# Patient Record
Sex: MALE | Race: WHITE | NOT HISPANIC OR LATINO | Employment: OTHER | URBAN - METROPOLITAN AREA
[De-identification: names, ages, dates, MRNs, and addresses within clinical notes are randomized per-mention and may not be internally consistent; named-entity substitution may affect disease eponyms.]

---

## 2018-10-11 ENCOUNTER — OFFICE VISIT (OUTPATIENT)
Dept: ORTHOPEDIC SURGERY | Facility: CLINIC | Age: 73
End: 2018-10-11

## 2018-10-11 VITALS — BODY MASS INDEX: 36.57 KG/M2 | HEART RATE: 62 BPM | HEIGHT: 69 IN | OXYGEN SATURATION: 98 % | WEIGHT: 246.91 LBS

## 2018-10-11 DIAGNOSIS — M25.512 LEFT SHOULDER PAIN, UNSPECIFIED CHRONICITY: Primary | ICD-10-CM

## 2018-10-11 DIAGNOSIS — M75.02 ADHESIVE CAPSULITIS OF LEFT SHOULDER: ICD-10-CM

## 2018-10-11 DIAGNOSIS — IMO0002 DISORDER OF ROTATOR CUFF SYNDROME OF LEFT SHOULDER AND ALLIED DISORDER: ICD-10-CM

## 2018-10-11 PROCEDURE — 99204 OFFICE O/P NEW MOD 45 MIN: CPT | Performed by: ORTHOPAEDIC SURGERY

## 2018-10-11 PROCEDURE — 20610 DRAIN/INJ JOINT/BURSA W/O US: CPT | Performed by: ORTHOPAEDIC SURGERY

## 2018-10-11 RX ORDER — PNV NO.95/FERROUS FUM/FOLIC AC 28MG-0.8MG
TABLET ORAL
COMMUNITY

## 2018-10-11 RX ORDER — ATORVASTATIN CALCIUM 40 MG/1
TABLET, FILM COATED ORAL
COMMUNITY
Start: 2018-10-09

## 2018-10-11 RX ORDER — LIDOCAINE HYDROCHLORIDE 10 MG/ML
3 INJECTION, SOLUTION INFILTRATION; PERINEURAL
Status: COMPLETED | OUTPATIENT
Start: 2018-10-11 | End: 2018-10-11

## 2018-10-11 RX ORDER — ASPIRIN 81 MG/1
81 TABLET ORAL DAILY
COMMUNITY

## 2018-10-11 RX ORDER — TRIAMCINOLONE ACETONIDE 40 MG/ML
40 INJECTION, SUSPENSION INTRA-ARTICULAR; INTRAMUSCULAR
Status: COMPLETED | OUTPATIENT
Start: 2018-10-11 | End: 2018-10-11

## 2018-10-11 RX ORDER — ALLOPURINOL 300 MG/1
TABLET ORAL
COMMUNITY
Start: 2018-08-17

## 2018-10-11 RX ORDER — METOLAZONE 5 MG/1
TABLET ORAL
COMMUNITY
Start: 2018-07-24

## 2018-10-11 RX ORDER — HYDRALAZINE HYDROCHLORIDE 50 MG/1
50 TABLET, FILM COATED ORAL 2 TIMES DAILY
COMMUNITY
Start: 2018-10-01

## 2018-10-11 RX ORDER — AMLODIPINE BESYLATE 5 MG/1
5 TABLET ORAL DAILY
COMMUNITY
Start: 2018-08-20

## 2018-10-11 RX ORDER — MAGNESIUM OXIDE 400 MG/1
400 TABLET ORAL DAILY
COMMUNITY

## 2018-10-11 RX ORDER — MELATONIN
2000 DAILY
COMMUNITY

## 2018-10-11 RX ADMIN — LIDOCAINE HYDROCHLORIDE 3 ML: 10 INJECTION, SOLUTION INFILTRATION; PERINEURAL at 15:06

## 2018-10-11 RX ADMIN — TRIAMCINOLONE ACETONIDE 40 MG: 40 INJECTION, SUSPENSION INTRA-ARTICULAR; INTRAMUSCULAR at 15:06

## 2018-10-11 NOTE — PROGRESS NOTES
[facility]    Subjective     Pain of the Left Shoulder (Left shoulder pain started 3-4 years ago.  He had a fall at that time.  /Pain is at a 6 when move and a 0 when resting/Had PT at time of fall/)      BRITTNEE Mobley is a 73 y.o. male.  Patient is here today for new problem today regarding his left shoulder.  This began about 3-4 years ago when he fell directly on the shoulder.  Initially, he went to physical therapy for 2 weeks and they really could not make a lot of ground.  They told him to live with it until he could no longer live with it.  He's had worsening pain over the last several months.  He rates the pain as 6 out of 10.  He has difficulty moving the shoulder and reaching behind him and overhead.  He's here for further evaluation and treatment.     Past Medical History:   Diagnosis Date   • Diabetes (CMS/HCC)    • Heart disease    • Hypertension    • Osteoarthritis       History reviewed. No pertinent surgical history.   Family History   Problem Relation Age of Onset   • Osteoarthritis Mother    • Hypertension Mother    • Osteoarthritis Father    • Heart attack Father      Social History     Social History   • Marital status:      Spouse name: N/A   • Number of children: N/A   • Years of education: N/A     Occupational History   • Not on file.     Social History Main Topics   • Smoking status: Former Smoker     Years: 4.00     Quit date: 1968   • Smokeless tobacco: Never Used   • Alcohol use No   • Drug use: No   • Sexual activity: Defer     Other Topics Concern   • Not on file     Social History Narrative   • No narrative on file      No current outpatient prescriptions on file prior to visit.     No current facility-administered medications on file prior to visit.       No Known Allergies     The following portions of the patient's history were reviewed and updated as appropriate: allergies, current medications, past family history, past medical history, past  "social history, past surgical history and problem list.    Review of Systems   Constitutional: Negative.    HENT: Negative.    Eyes: Negative.    Respiratory: Positive for apnea.    Cardiovascular: Negative.    Gastrointestinal: Negative.    Endocrine: Positive for cold intolerance.   Genitourinary: Negative.    Musculoskeletal: Positive for arthralgias.   Skin: Negative.    Allergic/Immunologic: Negative.    Neurological: Negative.    Hematological: Negative.    Psychiatric/Behavioral: Negative.         Objective      Physical Exam  Pulse 62   Ht 175.3 cm (69\")   Wt 112 kg (246 lb 14.6 oz)   SpO2 98%   BMI 36.46 kg/m²     Body mass index is 36.46 kg/m².    General  Mental Status - alert  General Appearance - cooperative, well groomed, not in acute distress  Orientation - Oriented X3  Build & Nutrition - well developed and well nourished  Posture - normal posture  Gait - normal gait     Integumentary  Global Assessment  Examination of related systems reveals - no lymphadenopathy  Ears:  No abnormality  Nose:  No mucous drainage  General Characteristics  Overall examination of the patient's skin reveals - no rashes, no evidence of scars, no suspicious lesions and no bruises.  Color - normal coloration of skin.  Vascular: Brisk capillary refill in all extremities    Ortho Exam  Musculoskeletal   Upper Extremity   Left Shoulder     Inspection and Palpation:     AC Joint Tenderness - mild    Sensation is normal    Examination reveals no ecchymosis.        Strength and Tone:    Supraspinatus - 5/5 with pain    External Rotators-5/5    Infraspinatus - 5/5    Subscapularis - 5/5    Deltoid - 5/5     Range of Motion      Left Shoulder:    Internal Rotation: ROM - hip pocket    External Rotation: AROM - 40 degrees    Elevation through flexion: AROM - 100 degrees        Impingement   Left shoulder    Banda-Jb impingement test positive    Neer impingement test negative     Functional Testing   Left shoulder    AC " crossover adduction test positive      Imaging/Studies  Imaging Results (last 24 hours)     Procedure Component Value Units Date/Time    XR Shoulder 2+ View Left [[id number]] Resulted:  10/11/18 1448     Updated:  10/11/18 1449    Narrative:       Left Shoulder X-Ray    Indication: Pain    Study:  AP, scapular Y, and axillary lateral views     Comparison: none    Findings:  No acute fractures are visualized.  No bony lesions are visualized.  Normal soft tissue appearance  AC joint:  Moderate joint space narrowing  Glenohumeral joint:  Minimal joint space narrowing  Acromion morphology:  Type 2      Impression:  Type II acromion, moderate acromioclavicular joint   degenerative changes.            Assessment:  1. Left shoulder pain, unspecified chronicity    2. Adhesive capsulitis of left shoulder    3. 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 Continue over-the-counter medication as needed for discomfort  2. Patient will be sent for physical therapy in [place]  3. Injection will be given into the left subacromial space today  4. We've talked about the natural history of this condition and given duration of symptoms, I'm concerned that his range of motion may never improve.  Consider modalities to the glenohumeral joint when he returns in 4-6 weeks.      Medical Decision Making  Management Options : over-the-counter medicine, prescription/IM medicine and physical/occupational therapy  Data/Risk: radiology tests and independent visualization of imaging, lab tests, or EMG/NCV    Zen Vang MD  10/11/18  6:02 PM

## 2018-10-11 NOTE — PROGRESS NOTES
Procedure   Large Joint Arthrocentesis  Date/Time: 10/11/2018 3:06 PM  Consent given by: patient  Site marked: site marked  Timeout: Immediately prior to procedure a time out was called to verify the correct patient, procedure, equipment, support staff and site/side marked as required   Supporting Documentation  Indications: pain   Procedure Details  Location: shoulder - L subacromial bursa  Preparation: Patient was prepped and draped in the usual sterile fashion  Needle size: 25 G  Approach: posterior  Medications administered: 3 mL lidocaine 1 %; 40 mg triamcinolone acetonide 40 MG/ML  Patient tolerance: patient tolerated the procedure well with no immediate complications

## 2018-11-20 ENCOUNTER — OFFICE VISIT (OUTPATIENT)
Dept: ORTHOPEDIC SURGERY | Facility: CLINIC | Age: 73
End: 2018-11-20

## 2018-11-20 VITALS — HEIGHT: 69 IN | BODY MASS INDEX: 36.96 KG/M2 | HEART RATE: 72 BPM | WEIGHT: 249.56 LBS | OXYGEN SATURATION: 96 %

## 2018-11-20 DIAGNOSIS — IMO0002 DISORDER OF ROTATOR CUFF SYNDROME OF LEFT SHOULDER AND ALLIED DISORDER: ICD-10-CM

## 2018-11-20 DIAGNOSIS — M25.512 LEFT SHOULDER PAIN, UNSPECIFIED CHRONICITY: Primary | ICD-10-CM

## 2018-11-20 DIAGNOSIS — M75.02 ADHESIVE CAPSULITIS OF LEFT SHOULDER: ICD-10-CM

## 2018-11-20 PROCEDURE — 99212 OFFICE O/P EST SF 10 MIN: CPT | Performed by: ORTHOPAEDIC SURGERY

## 2018-11-20 NOTE — PROGRESS NOTES
"    [facility]    Subjective     CC: Follow-up (6 week f/u; left shoulder pain, injection given at last visit)      BRITTNEE Mobley is a 73 y.o. male.  Patient returns the office today for follow-up of his left shoulder pain.  He was given a subacromial injection and as a result, he tells me he is much better overall.  He continues to do therapy in [place].       ROS:    Constiutional:Pt denies fever, chills, nausea, or vomiting.  MSK:as above    Objective      Past Medical History  Past Medical History:   Diagnosis Date   • Diabetes (CMS/HCC)    • Heart disease    • Hypertension    • Osteoarthritis          Physical Exam  Pulse 72   Ht 175.3 cm (69.02\")   Wt 113 kg (249 lb 9 oz)   SpO2 96%   BMI 36.84 kg/m²     Body mass index is 36.84 kg/m².    Patient is well nourished and well developed.        Ortho Exam  Forward elevation 140  External rotation 60  5 out of 5 subscap  5 out of 5 supraspinatus      Assessment:  1. Left shoulder pain, unspecified chronicity    2. Adhesive capsulitis of left shoulder    3. 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 Recommend over the counter anti-inflammatories for pain and/or swelling  2. Patient can be observed for now  3. Continue physical therapy        Zen Vang MD  11/20/18  6:46 PM  "

## 2020-11-12 ENCOUNTER — OFFICE VISIT (OUTPATIENT)
Dept: ENDOCRINOLOGY | Facility: CLINIC | Age: 75
End: 2020-11-12

## 2020-11-12 VITALS
HEART RATE: 61 BPM | SYSTOLIC BLOOD PRESSURE: 124 MMHG | BODY MASS INDEX: 39.63 KG/M2 | DIASTOLIC BLOOD PRESSURE: 80 MMHG | OXYGEN SATURATION: 97 % | WEIGHT: 267.6 LBS | TEMPERATURE: 97.5 F | HEIGHT: 69 IN

## 2020-11-12 DIAGNOSIS — Z79.4 LONG-TERM INSULIN USE (HCC): ICD-10-CM

## 2020-11-12 DIAGNOSIS — I10 BENIGN HYPERTENSION: Primary | ICD-10-CM

## 2020-11-12 DIAGNOSIS — E11.65 UNCONTROLLED TYPE 2 DIABETES MELLITUS WITH HYPERGLYCEMIA (HCC): ICD-10-CM

## 2020-11-12 PROBLEM — E78.00 PURE HYPERCHOLESTEROLEMIA: Status: ACTIVE | Noted: 2020-11-12

## 2020-11-12 PROBLEM — N18.9 CHRONIC RENAL FAILURE: Status: ACTIVE | Noted: 2020-11-12

## 2020-11-12 PROCEDURE — 99213 OFFICE O/P EST LOW 20 MIN: CPT | Performed by: INTERNAL MEDICINE

## 2020-11-12 RX ORDER — ISOSORBIDE MONONITRATE 30 MG/1
30 TABLET, EXTENDED RELEASE ORAL DAILY
COMMUNITY
Start: 2020-08-31

## 2020-11-12 RX ORDER — PEN NEEDLE, DIABETIC 32GX 5/32"
1 NEEDLE, DISPOSABLE MISCELLANEOUS 2 TIMES DAILY
Qty: 200 EACH | Refills: 3 | Status: SHIPPED | OUTPATIENT
Start: 2020-11-12 | End: 2021-12-15

## 2020-11-12 RX ORDER — INSULIN ASPART 100 [IU]/ML
INJECTION, SUSPENSION SUBCUTANEOUS
Qty: 27 ML | Refills: 5 | Status: SHIPPED | OUTPATIENT
Start: 2020-11-12 | End: 2021-03-11 | Stop reason: SDUPTHER

## 2020-11-12 RX ORDER — PEN NEEDLE, DIABETIC 32GX 5/32"
NEEDLE, DISPOSABLE MISCELLANEOUS 2 TIMES DAILY
COMMUNITY
Start: 2020-10-16 | End: 2020-11-12 | Stop reason: SDUPTHER

## 2020-11-12 RX ORDER — INSULIN ASPART 100 [IU]/ML
INJECTION, SUSPENSION SUBCUTANEOUS
COMMUNITY
Start: 2020-10-06 | End: 2020-11-12 | Stop reason: SDUPTHER

## 2020-11-12 NOTE — ASSESSMENT & PLAN NOTE
Diabetes is unchanged.   Continue current treatment regimen.  Diabetes will be reassessed in 3 months.    Recent A1c was 5.9%.

## 2020-12-03 ENCOUNTER — TELEPHONE (OUTPATIENT)
Dept: ENDOCRINOLOGY | Facility: CLINIC | Age: 75
End: 2020-12-03

## 2020-12-15 ENCOUNTER — TELEPHONE (OUTPATIENT)
Dept: ENDOCRINOLOGY | Facility: CLINIC | Age: 75
End: 2020-12-15

## 2020-12-15 NOTE — TELEPHONE ENCOUNTER
Called pt per wrm:  He reviewed bs's increase morning insulin to 50 units.  He voiced understanding

## 2021-03-11 ENCOUNTER — OFFICE VISIT (OUTPATIENT)
Dept: ENDOCRINOLOGY | Facility: CLINIC | Age: 76
End: 2021-03-11

## 2021-03-11 VITALS
BODY MASS INDEX: 39.84 KG/M2 | HEART RATE: 60 BPM | HEIGHT: 69 IN | SYSTOLIC BLOOD PRESSURE: 128 MMHG | WEIGHT: 269 LBS | OXYGEN SATURATION: 96 % | DIASTOLIC BLOOD PRESSURE: 72 MMHG | TEMPERATURE: 97.5 F

## 2021-03-11 DIAGNOSIS — Z79.4 LONG-TERM INSULIN USE (HCC): ICD-10-CM

## 2021-03-11 DIAGNOSIS — E11.65 UNCONTROLLED TYPE 2 DIABETES MELLITUS WITH HYPERGLYCEMIA (HCC): Primary | ICD-10-CM

## 2021-03-11 DIAGNOSIS — I10 BENIGN HYPERTENSION: ICD-10-CM

## 2021-03-11 DIAGNOSIS — E78.00 PURE HYPERCHOLESTEROLEMIA: ICD-10-CM

## 2021-03-11 LAB
EXPIRATION DATE: NORMAL
HBA1C MFR BLD: 5.6 %
Lab: NORMAL

## 2021-03-11 PROCEDURE — 83036 HEMOGLOBIN GLYCOSYLATED A1C: CPT | Performed by: INTERNAL MEDICINE

## 2021-03-11 PROCEDURE — 99214 OFFICE O/P EST MOD 30 MIN: CPT | Performed by: INTERNAL MEDICINE

## 2021-03-11 RX ORDER — INSULIN ASPART 100 [IU]/ML
INJECTION, SUSPENSION SUBCUTANEOUS
Qty: 90 ML | Refills: 3 | Status: SHIPPED | OUTPATIENT
Start: 2021-03-11 | End: 2021-09-20

## 2021-03-11 RX ORDER — BLOOD SUGAR DIAGNOSTIC
1 STRIP MISCELLANEOUS 2 TIMES DAILY
COMMUNITY
Start: 2021-02-12 | End: 2021-06-03 | Stop reason: SDUPTHER

## 2021-03-11 NOTE — PROGRESS NOTES
"     Office Note      Date: 2021  Patient Name: Abraham Mobley  MRN: [id number]  : 1945    Chief Complaint   Patient presents with   • Diabetes       History of Present Illness:   Abraham Mobley is a 75 y.o. male who presents for Diabetes type 2. Diagnosed in: . Treated in past with insulin. Current treatments: premix insulin. Number of insulin shots per day: 2. Checks blood sugar 2 times a day. Has low blood sugar: no. Aspirin use: Yes. Statin use: Yes. ACE-I/ARB use: No - per nephrology. Changes in health since last visit: none. Last eye exam .    Subjective      Diabetic Complications:  Eyes: No  Kidneys: Yes - CRI  Feet: No  Heart: No     Diet and Exercise:  Meals per day: 3  Minutes of exercise per week: 0 mins.    Review of Systems:   Review of Systems   Constitutional: Negative.    Cardiovascular: Negative.    Gastrointestinal: Negative.    Endocrine: Negative.        The following portions of the patient's history were reviewed and updated as appropriate: allergies, current medications, past family history, past medical history, past social history, past surgical history and problem list.    Objective       Visit Vitals  /72 (BP Location: Right arm, Patient Position: Sitting, Cuff Size: Adult)   Pulse 60   Temp 97.5 °F (36.4 °C) (Infrared)   Ht 175.3 cm (69\")   Wt 122 kg (269 lb)   SpO2 96%   BMI 39.72 kg/m²       Physical Exam:  Physical Exam  Constitutional:       Appearance: Normal appearance.   Neurological:      Mental Status: He is alert.         Labs:    HbA1c  Lab Results   Component Value Date    HGBA1C 5.6 2021       CMP  No results found for: GLUCOSE, BUN, CREATININE, EGFRIFNONA, EGFRIFAFRI, BCR, K, CO2, CALCIUM, PROTENTOTREF, LABIL2, BILIRUBIN, AST, ALT     Lipid Panel        TSH  No results found for: TSH, FREET4     Hemoglobin A1C  Lab Results   Component Value Date    HGBA1C 5.6 2021        Microalbumin/Creatinine  No results found for: MALBCRERATIO, CREATINIURIN, " MICROALBUR        Assessment / Plan      Assessment & Plan:  Diagnoses and all orders for this visit:    1. Uncontrolled type 2 diabetes mellitus with hyperglycemia (CMS/[facility]) (Primary)  Assessment & Plan:  Diabetes is unchanged.   Continue current treatment regimen.  But increase AM insulin due to some higher afternoon readings.  Diabetes will be reassessed in 3 months.    Orders:  -     POC Glycosylated Hemoglobin (Hb A1C)    2. Benign hypertension  Assessment & Plan:  Hypertension is unchanged.  Continue current treatment regimen.  Blood pressure will be reassessed at the next regular appointment.      3. Pure hypercholesterolemia  Assessment & Plan:  Continue statin.      4. Long-term insulin use (CMS/[facility])    Other orders  -     NovoLOG Mix 70/30 FlexPen (70-30) 100 UNIT/ML suspension pen-injector injection; Inject 55 units at breakfast and 45 units at supper.  Dispense: 90 mL; Refill: 3      Return in about 3 months (around 6/11/2021) for Recheck with A1c, TSH.    Harpreet Oliveros MD   03/11/2021

## 2021-03-11 NOTE — ASSESSMENT & PLAN NOTE
Diabetes is unchanged.   Continue current treatment regimen.  But increase AM insulin due to some higher afternoon readings.  Diabetes will be reassessed in 3 months.

## 2021-04-07 ENCOUNTER — TELEPHONE (OUTPATIENT)
Dept: ENDOCRINOLOGY | Facility: CLINIC | Age: 76
End: 2021-04-07

## 2021-04-07 NOTE — TELEPHONE ENCOUNTER
----- Message from Harpreet Oliveros MD sent at 4/6/2021  5:06 PM EDT -----      ----- Message -----  From: Dora Posada  Sent: 4/6/2021   1:57 PM EDT  To: Harpreet Oliveros MD

## 2021-05-14 ENCOUNTER — TELEPHONE (OUTPATIENT)
Dept: ENDOCRINOLOGY | Facility: CLINIC | Age: 76
End: 2021-05-14

## 2021-05-14 NOTE — TELEPHONE ENCOUNTER
Called pt per wrm:  He reviewed blood sugars increase morning 70/30 to 58 units.  He voiced understanding

## 2021-05-16 ENCOUNTER — TELEPHONE (OUTPATIENT)
Dept: ENDOCRINOLOGY | Facility: CLINIC | Age: 76
End: 2021-05-16

## 2021-05-16 NOTE — TELEPHONE ENCOUNTER
Contacted by patient's wife regarding recent hypoglycemia, weakness, chills.     Patients wife reports that 70/30 dose was recently increased by Dr. Oliveros to 58 units in the AM and 40 units in the evening. She reports that he has had hypoglycemia on the last 2 mornings, 35 yesterday AM and 50 this AM. She reports no recent hypoglycemia prior to this. 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 She reports that appetite had been reduced and that patient has felt poorly since surgery but denies any recent change in PO intake. She reports that he was not placed on steroids after surgery and was told that his other adrenal gland would compensate. He was admitted in late April to [facility] for UTI.    Following hypoglycemia yesterday, wife reports that she took patient to the [facility] ER  and that infection was ruled out and he was then released after correction of glucose. She reports that his mentation was altered this AM and thus she called EMS for transport the patient to the local hospital. She reports that again his glucose was treated and he was released. She reports that ER doctor recommended reduction in insulin, prompting her call.     At the time of call, patient's glucose was 192. Wife reports he had recently had a piece of candy due to fear of recurring hypoglycemia. He had last taken 50 units of 70/30 this AM.  Wife reported that patient was weak, having chills and was nauseated/dry heaving. She did not have thermometer to check temperature. I discussed with her that in the setting of a normal glucose, his symptoms could not be attributed to hypoglycemia and warranted further evaluation. I recommended she contact EMS for transport back to the ER.     Discussed with wife that such profound hypoglycemia is difficult to attribute to recent slight change in insulin (by 3 units) and my concern for something else contributing to low glucose levels.   Specifically, in the light of recent reported adrenal surgery, I advised of my concern for adrenal insufficiency and discussed that this could be life threatening. She was unsure if cortisol level had been checked but stated she would make provider aware of recent surgery and this concern. She was advised to call back if there were questions.    Again, wife was advised to call EMS to transport patient to ER for evaluation. She voiced understanding. Discussed that should he be released after evaluation, they may reduce insulin dose by half (29 units in the AM and 20 units in the evening, reviewed this should be held if not eating.) Advised patient's wife that I would notify Dr. Oliveros.

## 2021-05-17 ENCOUNTER — TELEPHONE (OUTPATIENT)
Dept: ENDOCRINOLOGY | Facility: CLINIC | Age: 76
End: 2021-05-17

## 2021-05-17 NOTE — TELEPHONE ENCOUNTER
See phone note from Dr La.  Would you call to check on him?  Can we get labs from the ER visit and see if they checked a cortisol level please?

## 2021-05-17 NOTE — TELEPHONE ENCOUNTER
Spoke with patients wife.  States he is currently inpatient at [facility] for double pneumonia.  She states they did a cortisol level and it was normal. They are monitoring his sugar as well and advised they would give insulin if needed.

## 2021-05-21 ENCOUNTER — TELEPHONE (OUTPATIENT)
Dept: ENDOCRINOLOGY | Facility: CLINIC | Age: 76
End: 2021-05-21

## 2021-05-21 NOTE — TELEPHONE ENCOUNTER
PT'S WIFE CALLED STATING THAT THIS PT WAS DISCHARGED FROM [facility] YESTERDAY. SHE STATED THE HOSPITALIST CHANGED HIS DM MEDS. PT'S WIFE STATED THAT [facility] SHOULD HAVE FAXED US SOMETHING IN REGARDS TO THIS. PT'S WIFE REQUESTED A CONSULT ON MED CHANGE. PLEASE CONFER W/ DR AND REACH OUT TO PT'S WIFE. THANK YOU

## 2021-05-21 NOTE — TELEPHONE ENCOUNTER
As long as he is eating okay, let's start back the insulin at 10u at BF and 10u at supper.  Send in readings next week.

## 2021-05-21 NOTE — TELEPHONE ENCOUNTER
Spoke with patients wife.  Was discharged yesterday.  Was hospitalized for hypoglycemia.  Held insulin for hospital stay.  Was told to hold blood sugar and give 8 units twice daily if blood sugars are >200.  Was on 58 units previously.  Requested discharge summary from [facility].

## 2021-06-01 ENCOUNTER — OFFICE VISIT (OUTPATIENT)
Dept: ENDOCRINOLOGY | Facility: CLINIC | Age: 76
End: 2021-06-01

## 2021-06-01 VITALS
DIASTOLIC BLOOD PRESSURE: 64 MMHG | SYSTOLIC BLOOD PRESSURE: 124 MMHG | HEIGHT: 69 IN | WEIGHT: 253 LBS | HEART RATE: 68 BPM | OXYGEN SATURATION: 96 % | BODY MASS INDEX: 37.47 KG/M2

## 2021-06-01 DIAGNOSIS — E11.649 TYPE 2 DIABETES MELLITUS WITH HYPOGLYCEMIA WITHOUT COMA, WITH LONG-TERM CURRENT USE OF INSULIN (HCC): ICD-10-CM

## 2021-06-01 DIAGNOSIS — I10 BENIGN HYPERTENSION: ICD-10-CM

## 2021-06-01 DIAGNOSIS — E11.65 UNCONTROLLED TYPE 2 DIABETES MELLITUS WITH HYPERGLYCEMIA (HCC): Primary | ICD-10-CM

## 2021-06-01 DIAGNOSIS — Z79.4 TYPE 2 DIABETES MELLITUS WITH HYPOGLYCEMIA WITHOUT COMA, WITH LONG-TERM CURRENT USE OF INSULIN (HCC): ICD-10-CM

## 2021-06-01 DIAGNOSIS — Z79.4 LONG-TERM INSULIN USE (HCC): ICD-10-CM

## 2021-06-01 DIAGNOSIS — E78.00 PURE HYPERCHOLESTEROLEMIA: ICD-10-CM

## 2021-06-01 PROCEDURE — 99214 OFFICE O/P EST MOD 30 MIN: CPT | Performed by: INTERNAL MEDICINE

## 2021-06-01 RX ORDER — TAMSULOSIN HYDROCHLORIDE 0.4 MG/1
1 CAPSULE ORAL DAILY
COMMUNITY

## 2021-06-01 RX ORDER — SODIUM BICARBONATE 650 MG/1
650 TABLET ORAL 3 TIMES DAILY
COMMUNITY
End: 2022-01-10

## 2021-06-01 RX ORDER — DOCUSATE SODIUM 100 MG/1
100 CAPSULE, LIQUID FILLED ORAL 2 TIMES DAILY
COMMUNITY
End: 2022-09-12

## 2021-06-01 NOTE — ASSESSMENT & PLAN NOTE
Requiring lower insulin doses due to decreased appetite and weight loss.  I would rather have his glucose a bit high than too low currently.  Continue to send in FSBS for insulin adjustments.

## 2021-06-01 NOTE — PROGRESS NOTES
"     Office Note      Date: 2021  Patient Name: Abraham Mobley  MRN: [id number]  : 1945    Chief Complaint   Patient presents with   • Diabetes       History of Present Illness:   Abraham Mobley is a 75 y.o. male who presents for Diabetes type 2. Diagnosed in: . Treated in past with insulin. Current treatments: premix insulin. Number of insulin shots per day: 2. Checks blood sugar 2 times a day. Has low blood sugar: yes. Aspirin use: Yes. Statin use: Yes. ACE-I/ARB use: No - per nephrology. Changes in health since last visit: hospitalized with bilateral pneumonia. Last eye exam .    He had some issues with hypoglycemia since last visit.  He was advised to go to ER.  He was admitted to [facility] with bilateral pneumonia.  Cortisol level was checked and was okay.  Insulin was changed while at the hospital.      Subjective      Diabetic Complications:  Eyes: No  Kidneys: Yes - CRI  Feet: No  Heart: No    Diet and Exercise:  Meals per day: 3  Minutes of exercise per week: 0 mins.    Review of Systems:   Review of Systems   Constitutional: Negative.    Cardiovascular: Negative.    Gastrointestinal: Negative.    Endocrine: Negative.        The following portions of the patient's history were reviewed and updated as appropriate: allergies, current medications, past family history, past medical history, past social history, past surgical history and problem list.    Objective       Visit Vitals  /64 (BP Location: Left arm, Patient Position: Sitting, Cuff Size: Adult)   Pulse 68   Ht 175.3 cm (69\")   Wt 115 kg (253 lb)   SpO2 96%   BMI 37.36 kg/m²       Physical Exam:  Physical Exam  Constitutional:       Appearance: Normal appearance.   Neurological:      Mental Status: He is alert.         Labs:    HbA1c  Lab Results   Component Value Date    HGBA1C 5.6 2021       CMP  No results found for: GLUCOSE, BUN, CREATININE, EGFRIFNONA, EGFRIFAFRI, BCR, K, CO2, CALCIUM, PROTENTOTREF, LABIL2, BILIRUBIN, AST, ALT "     Lipid Panel        TSH  No results found for: TSH, FREET4     Hemoglobin A1C  Lab Results   Component Value Date    HGBA1C 5.6 03/11/2021        Microalbumin/Creatinine  No results found for: MALBCRERATIO, CREATINIURIN, MICROALBUR        Assessment / Plan      Assessment & Plan:  Diagnoses and all orders for this visit:    1. Uncontrolled type 2 diabetes mellitus with hyperglycemia (CMS/[facility]) (Primary)  Assessment & Plan:  Requiring lower insulin doses due to decreased appetite and weight loss.  I would rather have his glucose a bit high than too low currently.  Continue to send in FSBS for insulin adjustments.    Orders:  -     Hemoglobin A1c; Future  -     TSH; Future    2. Benign hypertension  Assessment & Plan:  Hypertension is unchanged.  Continue current treatment regimen.  Blood pressure will be reassessed at the next regular appointment.      3. Pure hypercholesterolemia  Assessment & Plan:  Continue statin.      4. Long-term insulin use (CMS/[facility])    5. Type 2 diabetes mellitus with hypoglycemia without coma, with long-term current use of insulin (CMS/[facility])  Assessment & Plan:  We discussed treatment of hypoglycemia.  Will send Rx for glucagon.      Other orders  -     glucagon (GLUCAGEN) 1 MG injection; Infuse 1 mg into a venous catheter 1 (One) Time for 1 dose.  Dispense: 1 each; Refill: 1      Return in about 4 weeks (around 6/29/2021) for Recheck with A1c, TSH.    Harpreet Oliveros MD   06/01/2021

## 2021-06-03 RX ORDER — BLOOD SUGAR DIAGNOSTIC
1 STRIP MISCELLANEOUS 2 TIMES DAILY
Qty: 200 EACH | Refills: 5 | Status: SHIPPED | OUTPATIENT
Start: 2021-06-03 | End: 2021-06-04 | Stop reason: SDUPTHER

## 2021-06-04 RX ORDER — BLOOD SUGAR DIAGNOSTIC
1 STRIP MISCELLANEOUS 2 TIMES DAILY
Qty: 200 EACH | Refills: 5 | Status: SHIPPED | OUTPATIENT
Start: 2021-06-04 | End: 2022-07-01

## 2021-06-04 RX ORDER — IBUPROFEN 600 MG/1
TABLET ORAL
Qty: 1 EACH | Refills: 1 | Status: SHIPPED | OUTPATIENT
Start: 2021-06-04 | End: 2021-06-07 | Stop reason: CLARIF

## 2021-06-04 NOTE — TELEPHONE ENCOUNTER
[facility] CALLED. STATED THEY NEEDED A NEW PRESCRIPTION SENT OVER FOR THE GLUCAGON. STATED PT NEEDS INJECTIONS NOT VIALS. THEY ALSO SAID THAT PT'S TEST STRIPS NEED A DX CODE AND NEEDED TO BE SENT BACK OVER.

## 2021-06-07 ENCOUNTER — TELEPHONE (OUTPATIENT)
Dept: ENDOCRINOLOGY | Facility: CLINIC | Age: 76
End: 2021-06-07

## 2021-06-07 RX ORDER — GLUCAGON HYDROCHLORIDE 1 MG
KIT INJECTION
Qty: 1 EACH | Refills: 3 | Status: SHIPPED | OUTPATIENT
Start: 2021-06-07 | End: 2021-06-07 | Stop reason: CLARIF

## 2021-06-07 RX ORDER — GLUCAGON INJECTION, SOLUTION 1 MG/.2ML
1 INJECTION, SOLUTION SUBCUTANEOUS AS NEEDED
Qty: 0.4 ML | Refills: 3 | Status: SHIPPED | OUTPATIENT
Start: 2021-06-07

## 2021-06-07 NOTE — TELEPHONE ENCOUNTER
PER PHARMACY GLUCAGEN NOT COVERED BY INSURANCE EITHER, IT SHOW G-VOKE HYPOPEN COVERED. NOT SURE WHICH DOSE TO CHOOSE ON THE G-VOKE. PLEASE ADD AND SEND TO PHARMACY. NAFISA

## 2021-06-23 ENCOUNTER — TELEPHONE (OUTPATIENT)
Dept: ENDOCRINOLOGY | Facility: CLINIC | Age: 76
End: 2021-06-23

## 2021-06-23 NOTE — TELEPHONE ENCOUNTER
Fasting blood sugar has gone up 130-150, running about the same with dinner. Please call back the caregiver to discuss at [phone]

## 2021-06-23 NOTE — TELEPHONE ENCOUNTER
Spoke with Chandrika, patients caregiver.  She states that his fasting blood sugars have been 140's-150's and his blood sugar has been the same at supper.  He is checking twice daily.  When they sent in numbers previously he did not have much of an appetite and his numbers were lower.  He is now eating much better.  Did have one reading of 114 on 6/19/2021.  Patient currently taking Novolog 70/30 10 units twice daily.

## 2021-07-01 ENCOUNTER — OFFICE VISIT (OUTPATIENT)
Dept: ENDOCRINOLOGY | Facility: CLINIC | Age: 76
End: 2021-07-01

## 2021-07-01 VITALS
OXYGEN SATURATION: 98 % | SYSTOLIC BLOOD PRESSURE: 126 MMHG | DIASTOLIC BLOOD PRESSURE: 66 MMHG | BODY MASS INDEX: 37.77 KG/M2 | WEIGHT: 255 LBS | HEART RATE: 74 BPM | HEIGHT: 69 IN

## 2021-07-01 DIAGNOSIS — I10 BENIGN HYPERTENSION: ICD-10-CM

## 2021-07-01 DIAGNOSIS — Z79.4 LONG-TERM INSULIN USE (HCC): ICD-10-CM

## 2021-07-01 DIAGNOSIS — E11.649 TYPE 2 DIABETES MELLITUS WITH HYPOGLYCEMIA WITHOUT COMA, WITH LONG-TERM CURRENT USE OF INSULIN (HCC): ICD-10-CM

## 2021-07-01 DIAGNOSIS — E11.65 UNCONTROLLED TYPE 2 DIABETES MELLITUS WITH HYPERGLYCEMIA (HCC): Primary | ICD-10-CM

## 2021-07-01 DIAGNOSIS — Z79.4 TYPE 2 DIABETES MELLITUS WITH HYPOGLYCEMIA WITHOUT COMA, WITH LONG-TERM CURRENT USE OF INSULIN (HCC): ICD-10-CM

## 2021-07-01 LAB — HBA1C MFR BLD: 6.2 %

## 2021-07-01 PROCEDURE — 99214 OFFICE O/P EST MOD 30 MIN: CPT | Performed by: INTERNAL MEDICINE

## 2021-07-01 NOTE — ASSESSMENT & PLAN NOTE
Diabetes is unchanged.   Continue current treatment regimen.  Continue to send in FSBS for insulin adjustments.  Diabetes will be reassessed in 3 months.

## 2021-07-01 NOTE — PROGRESS NOTES
"     Office Note      Date: 2021  Patient Name: Abraham Mobley  MRN: [id number]  : 1945    Chief Complaint   Patient presents with   • Diabetes       History of Present Illness:   Abraham Mobley is a 76 y.o. male who presents for Diabetes type 2. Diagnosed in: . Treated in past with insulin. Current treatments: premix insulin. Number of insulin shots per day: 2. Checks blood sugar 2 times a day. Has low blood sugar: none. Aspirin use: Yes. Statin use: Yes. ACE-I/ARB use: No - per nephrology. Changes in health since last visit: none. Last eye exam .    He had labs done recently.  A1c was 6.2%.  The TSH was normal at 2.71.    Subjective      Diabetic Complications:  Eyes: No  Kidneys: Yes - CRI  Feet: No  Heart: No    Diet and Exercise:  Meals per day: 3  Minutes of exercise per week: 0 mins.    Review of Systems:   Review of Systems   Constitutional: Negative.    Cardiovascular: Negative.    Gastrointestinal: Negative.    Endocrine: Negative.        The following portions of the patient's history were reviewed and updated as appropriate: allergies, current medications, past family history, past medical history, past social history, past surgical history and problem list.    Objective       Visit Vitals  /66 (BP Location: Left arm, Patient Position: Sitting, Cuff Size: Adult)   Pulse 74   Ht 175.3 cm (69\")   Wt 116 kg (255 lb)   SpO2 98%   BMI 37.66 kg/m²       Physical Exam:  Physical Exam  Constitutional:       Appearance: Normal appearance.   Neurological:      Mental Status: He is alert.         Labs:    HbA1c  Lab Results   Component Value Date    HGBA1C 6.2 2021       CMP  No results found for: GLUCOSE, BUN, CREATININE, EGFRIFNONA, EGFRIFAFRI, BCR, K, CO2, CALCIUM, PROTENTOTREF, LABIL2, BILIRUBIN, AST, ALT     Lipid Panel        TSH  No results found for: TSH, FREET4     Hemoglobin A1C  Lab Results   Component Value Date    HGBA1C 6.2 2021        Microalbumin/Creatinine  No results " found for: MALBCRERATIO, CREATINIURIN, MICROALBUR        Assessment / Plan      Assessment & Plan:  Diagnoses and all orders for this visit:    1. Uncontrolled type 2 diabetes mellitus with hyperglycemia (CMS/[facility]) (Primary)  Assessment & Plan:  Diabetes is unchanged.   Continue current treatment regimen.  Continue to send in FSBS for insulin adjustments.  Diabetes will be reassessed in 3 months.      2. Type 2 diabetes mellitus with hypoglycemia without coma, with long-term current use of insulin (CMS/[facility])  Assessment & Plan:  Continue FSBS.  No lows since insulin was decreased.       3. Benign hypertension  Assessment & Plan:  Hypertension is unchanged.  Continue current treatment regimen.  Blood pressure will be reassessed at the next regular appointment.      4. Long-term insulin use (CMS/[facility])      Return in about 3 months (around 10/1/2021) for Recheck with A1c.    Harpreet Oliveros MD   07/01/2021

## 2021-08-03 ENCOUNTER — TELEPHONE (OUTPATIENT)
Dept: ENDOCRINOLOGY | Facility: CLINIC | Age: 76
End: 2021-08-03

## 2021-08-03 NOTE — TELEPHONE ENCOUNTER
Called pt to let him know his blood sugar readings was good and that no changes needed to be made. Pt understood

## 2021-09-09 ENCOUNTER — TELEPHONE (OUTPATIENT)
Dept: ENDOCRINOLOGY | Facility: CLINIC | Age: 76
End: 2021-09-09

## 2021-09-10 ENCOUNTER — TELEPHONE (OUTPATIENT)
Dept: ENDOCRINOLOGY | Facility: CLINIC | Age: 76
End: 2021-09-10

## 2021-09-10 NOTE — TELEPHONE ENCOUNTER
Results are scanned under media.  Per note from Dr. Oliveros, no changes.  Patient notified and verbalized understanding.

## 2021-09-10 NOTE — TELEPHONE ENCOUNTER
Please call pt he said he mailed us his blood sugars from august  He hasn't heard anything yet  Please call  [phone]

## 2021-09-20 ENCOUNTER — OFFICE VISIT (OUTPATIENT)
Dept: ENDOCRINOLOGY | Facility: CLINIC | Age: 76
End: 2021-09-20

## 2021-09-20 VITALS
WEIGHT: 254 LBS | RESPIRATION RATE: 18 BRPM | HEART RATE: 81 BPM | SYSTOLIC BLOOD PRESSURE: 150 MMHG | BODY MASS INDEX: 37.62 KG/M2 | DIASTOLIC BLOOD PRESSURE: 70 MMHG | OXYGEN SATURATION: 96 % | HEIGHT: 69 IN

## 2021-09-20 DIAGNOSIS — Z79.4 TYPE 2 DIABETES MELLITUS WITH CHRONIC KIDNEY DISEASE, WITH LONG-TERM CURRENT USE OF INSULIN, UNSPECIFIED CKD STAGE (HCC): ICD-10-CM

## 2021-09-20 DIAGNOSIS — E11.22 TYPE 2 DIABETES MELLITUS WITH CHRONIC KIDNEY DISEASE, WITH LONG-TERM CURRENT USE OF INSULIN, UNSPECIFIED CKD STAGE (HCC): ICD-10-CM

## 2021-09-20 DIAGNOSIS — Z79.4 TYPE 2 DIABETES MELLITUS WITH HYPERGLYCEMIA, WITH LONG-TERM CURRENT USE OF INSULIN (HCC): Primary | Chronic | ICD-10-CM

## 2021-09-20 DIAGNOSIS — E11.65 TYPE 2 DIABETES MELLITUS WITH HYPERGLYCEMIA, WITH LONG-TERM CURRENT USE OF INSULIN (HCC): Primary | Chronic | ICD-10-CM

## 2021-09-20 PROCEDURE — 99213 OFFICE O/P EST LOW 20 MIN: CPT | Performed by: PHYSICIAN ASSISTANT

## 2021-09-20 RX ORDER — INSULIN ASPART 100 [IU]/ML
INJECTION, SUSPENSION SUBCUTANEOUS
Start: 2021-09-20 | End: 2022-09-12 | Stop reason: SDUPTHER

## 2021-09-20 NOTE — PROGRESS NOTES
"     Office Note      Date: 2021  Patient Name: Abraham Mobley  MRN: [id number]  : 1945    Chief Complaint   Patient presents with   • uncontrolled type 2 diabetes       History of Present Illness:   Abraham Mobley is a 76 y.o. male who presents today for follow up on type 2 diabetes, diagnosed in .  Known diabetic complications: chronic kidney disease.  Current diabetic medications include Novolog 70/30.  Currently taking 12 units with breakfast and 12 units with dinner.  Current monitoring regimen: home blood tests - 2 times daily  Home blood sugar records: fasting 130-160, before dinner 130-160  Any episodes of hypoglycemia? None in the past few months.  Feet: No sores or other issues.   Eye exam current (within one year): no, ~2019. No retinopathy.  Current exercise: yard work  ACE inhibitor/ARB: No, per nephrology.  Statin: Yes, atorvastatin.  He has history of renal cell cancer, s/p right nephrectomy and right adrenalectomy.  Nephrologist - Dr. Andrea.  He had labs last week.  The A1c was 6.4%.  Creatinine 2.5, eGFR 27.    Subjective      Review of Systems:   Review of Systems   Constitutional: Negative.    Cardiovascular: Negative.    Gastrointestinal: Negative.    Endocrine: Negative.        The following portions of the patient's history were reviewed and updated as appropriate: allergies, current medications, past family history, past medical history, past social history, past surgical history and problem list.    Objective     Vitals:    21 1338   BP: 150/70   Pulse: 81   Resp: 18   SpO2: 96%   Weight: 115 kg (254 lb)   Height: 175.3 cm (69\")     Body mass index is 37.51 kg/m².    Physical Exam  Vitals reviewed.   Constitutional:       General: He is not in acute distress.  Neurological:      Mental Status: He is alert and oriented to person, place, and time.   Psychiatric:         Mood and Affect: Affect normal.         HEMOGLOBIN A1C  Lab Results   Component Value Date    HGBA1C 6.2 " 06/26/2021    HGBA1C 5.6 03/11/2021       Current Outpatient Medications   Medication Instructions   • allopurinol (ZYLOPRIM) 300 MG tablet No dose, route, or frequency recorded.   • amLODIPine (NORVASC) 5 MG tablet No dose, route, or frequency recorded.   • aspirin 81 mg, Oral, Daily   • atorvastatin (LIPITOR) 40 MG tablet No dose, route, or frequency recorded.   • BD Pen Needle Chela U/F 32G X 4 MM misc 1 each, Subcutaneous, 2 Times Daily   • cholecalciferol (VITAMIN D3) 2,000 Units, Oral, Daily   • docusate sodium (COLACE) 100 mg, Oral, 2 Times Daily   • Ferrous Sulfate (IRON) 325 (65 Fe) MG tablet Oral   • Gvoke HypoPen 2-Pack 1 mg, Subcutaneous, As Needed   • hydrALAZINE (APRESOLINE) 50 mg, Oral, 2 Times Daily   • isosorbide mononitrate (IMDUR) 30 mg, Oral, Daily   • Lactobacillus (PROBIOTIC ACIDOPHILUS PO) Oral   • magnesium oxide (MAG-OX) 400 mg, Oral, Daily   • metOLazone (ZAROXOLYN) 5 MG tablet No dose, route, or frequency recorded.   • metoprolol tartrate (LOPRESSOR) 25 MG tablet No dose, route, or frequency recorded.   • Multiple Vitamin (MULTI VITAMIN DAILY PO) Oral   • NovoLOG Mix 70/30 FlexPen (70-30) 100 UNIT/ML suspension pen-injector injection Inject 12 units at breakfast and 12 units at supper   • OneTouch Ultra test strip 1 each, Other, 2 Times Daily, to check glucose.  Dx. E11.65   • sodium bicarbonate 650 mg, Oral, 3 Times Daily   • tamsulosin (FLOMAX) 0.4 MG capsule 24 hr capsule 1 capsule, Oral, Daily       Assessment / Plan      Assessment & Plan:  1. Type 2 diabetes mellitus with hyperglycemia, with long-term current use of insulin (CMS/[facility])  A1c okay.  We want to avoid hypoglycemia.  Recommend to continue current doses of insulin.  He will send in blood sugars for review.  - NovoLOG Mix 70/30 FlexPen (70-30) 100 UNIT/ML suspension pen-injector injection; Inject 12 units at breakfast and 12 units at supper    2. Type 2 diabetes mellitus with chronic kidney disease, with long-term current use  of insulin, unspecified CKD stage (CMS/HCC)  Continue follow up with nephrology.      Return in about 3 months (around 12/20/2021) for recheck with A1c, foot exam - usually sees Dr. Harpreet Oliveros.     KRYSTINA Jimenez  Endocrinology  09/20/2021

## 2021-10-11 ENCOUNTER — TELEPHONE (OUTPATIENT)
Dept: ENDOCRINOLOGY | Facility: CLINIC | Age: 76
End: 2021-10-11

## 2021-10-11 NOTE — TELEPHONE ENCOUNTER
Called pt to let him know that his blood sugar readings looked okay and needs no change. Patient verbally understood.

## 2021-11-03 ENCOUNTER — TELEPHONE (OUTPATIENT)
Dept: ENDOCRINOLOGY | Facility: CLINIC | Age: 76
End: 2021-11-03

## 2021-12-15 RX ORDER — PEN NEEDLE, DIABETIC 32GX 5/32"
NEEDLE, DISPOSABLE MISCELLANEOUS
Qty: 200 EACH | Refills: 3 | Status: SHIPPED | OUTPATIENT
Start: 2021-12-15 | End: 2023-01-11

## 2022-01-05 ENCOUNTER — TELEPHONE (OUTPATIENT)
Dept: ENDOCRINOLOGY | Facility: CLINIC | Age: 77
End: 2022-01-05

## 2022-01-05 NOTE — TELEPHONE ENCOUNTER
Spoke with pt to let him know that his blood sugar logs looked okay, per Teresa Vang. Pt verbalized understanding.

## 2022-01-10 ENCOUNTER — OFFICE VISIT (OUTPATIENT)
Dept: ENDOCRINOLOGY | Facility: CLINIC | Age: 77
End: 2022-01-10

## 2022-01-10 VITALS
OXYGEN SATURATION: 99 % | HEIGHT: 69 IN | DIASTOLIC BLOOD PRESSURE: 62 MMHG | SYSTOLIC BLOOD PRESSURE: 138 MMHG | HEART RATE: 60 BPM | BODY MASS INDEX: 37.69 KG/M2 | WEIGHT: 254.5 LBS

## 2022-01-10 DIAGNOSIS — Z79.4 TYPE 2 DIABETES MELLITUS WITH CHRONIC KIDNEY DISEASE, WITH LONG-TERM CURRENT USE OF INSULIN, UNSPECIFIED CKD STAGE: ICD-10-CM

## 2022-01-10 DIAGNOSIS — E11.65 TYPE 2 DIABETES MELLITUS WITH HYPERGLYCEMIA, WITH LONG-TERM CURRENT USE OF INSULIN: Primary | Chronic | ICD-10-CM

## 2022-01-10 DIAGNOSIS — E11.22 TYPE 2 DIABETES MELLITUS WITH CHRONIC KIDNEY DISEASE, WITH LONG-TERM CURRENT USE OF INSULIN, UNSPECIFIED CKD STAGE: ICD-10-CM

## 2022-01-10 DIAGNOSIS — Z79.4 TYPE 2 DIABETES MELLITUS WITH HYPERGLYCEMIA, WITH LONG-TERM CURRENT USE OF INSULIN: Primary | Chronic | ICD-10-CM

## 2022-01-10 LAB
EXPIRATION DATE: NORMAL
GLUCOSE BLDC GLUCOMTR-MCNC: 122 MG/DL (ref 70–130)
HBA1C MFR BLD: 6.2 %
Lab: NORMAL

## 2022-01-10 PROCEDURE — 82962 GLUCOSE BLOOD TEST: CPT | Performed by: PHYSICIAN ASSISTANT

## 2022-01-10 PROCEDURE — 99213 OFFICE O/P EST LOW 20 MIN: CPT | Performed by: PHYSICIAN ASSISTANT

## 2022-01-10 PROCEDURE — 83036 HEMOGLOBIN GLYCOSYLATED A1C: CPT | Performed by: PHYSICIAN ASSISTANT

## 2022-01-10 PROCEDURE — 3044F HG A1C LEVEL LT 7.0%: CPT | Performed by: PHYSICIAN ASSISTANT

## 2022-01-10 NOTE — PROGRESS NOTES
"     Office Note      Date: 01/10/2022  Patient Name: Abraham Mobley  MRN: [id number]  : 1945    Chief Complaint   Patient presents with   • Diabetes       History of Present Illness:   Abraham Mobley is a 76 y.o. male who presents today for follow up on type 2 diabetes, diagnosed in .  Known diabetic complications: chronic kidney disease.  Current diabetic medications include Novolog 70/30.    He is currently taking 12 units with breakfast and 12 units with dinner.  Current monitoring regimen: testing 2 times per day.  Home blood sugar records: fasting typically 130-150, before dinner 130-160.  Any episodes of hypoglycemia? None in the past few months.  Feet: No sores or other issues.   Eye exam current (within one year): yes, . No retinopathy.  Current exercise: None.  ACE inhibitor/ARB: No, per nephrology.  Statin: Yes, atorvastatin.  He has history of renal cell cancer, s/p right nephrectomy and right adrenalectomy.  Nephrologist - Dr. Andrea.  He had labs recently with nephrology.  Creatinine 2.4, eGFR 28.    Subjective      Review of Systems:   Review of Systems   Constitutional: Negative.    Cardiovascular: Negative.    Gastrointestinal: Negative.    Endocrine: Negative.        The following portions of the patient's history were reviewed and updated as appropriate: allergies, current medications, past family history, past medical history, past social history, past surgical history and problem list.    Objective     Vitals:    01/10/22 1328   BP: 138/62   BP Location: Left arm   Patient Position: Sitting   Cuff Size: Adult   Pulse: 60   SpO2: 99%   Weight: 115 kg (254 lb 8 oz)   Height: 175.3 cm (69\")     Body mass index is 37.58 kg/m².    Physical Exam  Vitals reviewed.   Constitutional:       General: He is not in acute distress.  Cardiovascular:      Pulses:           Dorsalis pedis pulses are 2+ on the right side and 2+ on the left side.        Posterior tibial pulses are 2+ on the right side and 2+ " on the left side.   Musculoskeletal:      Right foot: No deformity.      Left foot: No deformity.   Feet:      Right foot:      Protective Sensation: 8 sites tested. 6 sites sensed.      Skin integrity: Skin integrity normal.      Toenail Condition: Right toenails are normal.      Left foot:      Protective Sensation: 8 sites tested. 6 sites sensed.      Skin integrity: Skin integrity normal.      Toenail Condition: Left toenails are normal.      Comments: vib sensation absent to great toes and first MTP joints.  Neurological:      Mental Status: He is alert and oriented to person, place, and time.   Psychiatric:         Mood and Affect: Affect normal.         HEMOGLOBIN A1C  Lab Results   Component Value Date    HGBA1C 6.2 01/10/2022    HGBA1C 6.2 06/26/2021    HGBA1C 5.6 03/11/2021     GLUCOSE  Lab Results   Component Value Date    POCGLU 122 01/10/2022         Current Outpatient Medications   Medication Instructions   • allopurinol (ZYLOPRIM) 300 MG tablet No dose, route, or frequency recorded.   • amLODIPine (NORVASC) 5 MG tablet No dose, route, or frequency recorded.   • aspirin 81 mg, Oral, Daily   • atorvastatin (LIPITOR) 40 MG tablet No dose, route, or frequency recorded.   • BD Pen Needle Chela 2nd Gen 32G X 4 MM misc USE  SUBCUTANEOUSLY TWICE DAILY TO  APPROPRIATE  AREA  AS  DIRECTED   • cholecalciferol (VITAMIN D3) 2,000 Units, Oral, Daily   • docusate sodium (COLACE) 100 mg, Oral, 2 Times Daily   • Ferrous Sulfate (IRON) 325 (65 Fe) MG tablet Oral   • Gvoke HypoPen 2-Pack 1 mg, Subcutaneous, As Needed   • hydrALAZINE (APRESOLINE) 50 mg, Oral, 2 Times Daily   • isosorbide mononitrate (IMDUR) 30 mg, Oral, Daily   • Lactobacillus (PROBIOTIC ACIDOPHILUS PO) Oral   • magnesium oxide (MAG-OX) 400 mg, Oral, Daily   • metOLazone (ZAROXOLYN) 5 MG tablet No dose, route, or frequency recorded.   • metoprolol tartrate (LOPRESSOR) 25 MG tablet No dose, route, or frequency recorded.   • Multiple Vitamin (MULTI VITAMIN  DAILY PO) Oral   • NovoLOG Mix 70/30 FlexPen (70-30) 100 UNIT/ML suspension pen-injector injection Inject 12 units at breakfast and 12 units at supper   • OneTouch Ultra test strip 1 each, Other, 2 Times Daily, to check glucose.  Dx. E11.65   • tamsulosin (FLOMAX) 0.4 MG capsule 24 hr capsule 1 capsule, Oral, Daily       Assessment / Plan      Assessment & Plan:  1. Type 2 diabetes mellitus with hyperglycemia, with long-term current use of insulin ([facility])  A1c okay.  No known hypoglycemia.  Continue current doses of Novolog 70/30.  - POC Glucose  - POC Glycosylated Hemoglobin (Hb A1C)    2. Type 2 diabetes mellitus with chronic kidney disease, with long-term current use of insulin, unspecified CKD stage (HCC)  Continue follow-up with nephrology.      Return in 4 months (on 5/10/2022) for recheck with A1c. He was advised to contact the office with any interval questions or concerns.    KRYSTINA Jimenez  Endocrinology  01/10/2022

## 2022-04-08 ENCOUNTER — TELEPHONE (OUTPATIENT)
Dept: ENDOCRINOLOGY | Facility: CLINIC | Age: 77
End: 2022-04-08

## 2022-04-08 NOTE — TELEPHONE ENCOUNTER
Patients wife called stated Don is having a colonoscopy next week and they are not sure how to proceed with taking his insulin. Please advise.

## 2022-04-09 NOTE — TELEPHONE ENCOUNTER
Spoke with patient's wife.  She is concerned due to history of severe hypoglycemia after surgery last year.  Advised to continue current dose of insulin if eating regular diet this weekend.  She says he will be eating low residue diet on Monday, then clear liquid diet on Tuesday.  Recommend to decrease insulin doses by 1/2 on Monday as it sounds like he will be eating smaller quantity of food/carbohydrates.  Will hold insulin on Tuesday for clear liquid diet and Wednesday morning of colonoscopy.  Advised to call back if any questions.

## 2022-04-12 NOTE — PROGRESS NOTES
"     Office Note      Date: 2020  Patient Name: Abraham Mobley  MRN: [id number]  : 1945    Chief Complaint   Patient presents with   • Follow-up   • Diabetes       History of Present Illness:   Abraham Mobley is a 75 y.o. male who presents for Diabetes type 2. Diagnosed in: . Treated in past with insulin. Current treatments: premix insulin. Number of insulin shots per day: 2. Checks blood sugar 2 times a day. Has low blood sugar: no. Aspirin use: Yes. Statin use: Yes. ACE-I/ARB use: No - per nephrology. Changes in health since last visit: none. Last eye exam .    Subjective      Diabetic Complications:  Eyes: No  Kidneys: Yes - CRI  Feet: No  Heart: No    Diet and Exercise:  Meals per day: 3  Minutes of exercise per week: 0 mins.    Review of Systems:   Review of Systems   Constitutional: Negative.    Respiratory: Positive for shortness of breath.    Cardiovascular: Negative.    Gastrointestinal: Negative.    Endocrine: Negative.        The following portions of the patient's history were reviewed and updated as appropriate: allergies, current medications, past family history, past medical history, past social history, past surgical history and problem list.    Objective       Visit Vitals  /80   Pulse 61   Temp 97.5 °F (36.4 °C) (Infrared)   Ht 175.3 cm (69\")   Wt 121 kg (267 lb 9.6 oz)   SpO2 97%   BMI 39.52 kg/m²       Physical Exam:  Physical Exam  Constitutional:       Appearance: Normal appearance.   Neurological:      Mental Status: He is alert.         Labs:    HbA1c  No results found for: HGBA1C    CMP  No results found for: GLUCOSE, BUN, CREATININE, EGFRIFNONA, EGFRIFAFRI, BCR, K, CO2, CALCIUM, PROTENTOTREF, LABIL2, BILIRUBIN, AST, ALT     Lipid Panel        TSH  No results found for: TSH, FREET4     Hemoglobin A1C        Microalbumin/Creatinine  No results found for: MALBCRERATIO, CREATINIURIN, MICROALBUR        Assessment / Plan      Assessment & Plan:  Problem List Items Addressed This " Visit        Cardiovascular and Mediastinum    Benign hypertension - Primary    Current Assessment & Plan     Hypertension is unchanged.  Continue current treatment regimen.  Blood pressure will be reassessed in 3 months.         Relevant Medications    amLODIPine (NORVASC) 5 MG tablet    metOLazone (ZAROXOLYN) 5 MG tablet    metoprolol tartrate (LOPRESSOR) 25 MG tablet    hydrALAZINE (APRESOLINE) 50 MG tablet       Endocrine    Uncontrolled type 2 diabetes mellitus with hyperglycemia (CMS/HCC)    Current Assessment & Plan     Diabetes is unchanged.   Continue current treatment regimen.  Diabetes will be reassessed in 3 months.    Recent A1c was 5.9%.         Relevant Medications    NovoLOG Mix 70/30 FlexPen (70-30) 100 UNIT/ML suspension pen-injector injection       Other    Long-term insulin use (CMS/HCC)           Return in about 3 months (around 2/12/2021) for Recheck with A1c.    Harpreet Oliveros MD   11/12/2020   TRANSFER

## 2022-05-10 ENCOUNTER — OFFICE VISIT (OUTPATIENT)
Dept: ENDOCRINOLOGY | Facility: CLINIC | Age: 77
End: 2022-05-10

## 2022-05-10 VITALS
BODY MASS INDEX: 38.51 KG/M2 | HEART RATE: 68 BPM | SYSTOLIC BLOOD PRESSURE: 138 MMHG | DIASTOLIC BLOOD PRESSURE: 82 MMHG | HEIGHT: 69 IN | WEIGHT: 260 LBS | OXYGEN SATURATION: 96 %

## 2022-05-10 DIAGNOSIS — Z79.4 TYPE 2 DIABETES MELLITUS WITH HYPERGLYCEMIA, WITH LONG-TERM CURRENT USE OF INSULIN: Primary | Chronic | ICD-10-CM

## 2022-05-10 DIAGNOSIS — E11.65 TYPE 2 DIABETES MELLITUS WITH HYPERGLYCEMIA, WITH LONG-TERM CURRENT USE OF INSULIN: Primary | Chronic | ICD-10-CM

## 2022-05-10 LAB
EXPIRATION DATE: ABNORMAL
EXPIRATION DATE: NORMAL
GLUCOSE BLDC GLUCOMTR-MCNC: 159 MG/DL (ref 70–130)
HBA1C MFR BLD: 6.2 %
Lab: ABNORMAL
Lab: NORMAL

## 2022-05-10 PROCEDURE — 3044F HG A1C LEVEL LT 7.0%: CPT | Performed by: PHYSICIAN ASSISTANT

## 2022-05-10 PROCEDURE — 99213 OFFICE O/P EST LOW 20 MIN: CPT | Performed by: PHYSICIAN ASSISTANT

## 2022-05-10 PROCEDURE — 83036 HEMOGLOBIN GLYCOSYLATED A1C: CPT | Performed by: PHYSICIAN ASSISTANT

## 2022-05-10 PROCEDURE — 82947 ASSAY GLUCOSE BLOOD QUANT: CPT | Performed by: PHYSICIAN ASSISTANT

## 2022-05-10 RX ORDER — PEN NEEDLE, DIABETIC 32GX 5/32"
NEEDLE, DISPOSABLE MISCELLANEOUS
Qty: 200 EACH | Refills: 3 | Status: CANCELLED | OUTPATIENT
Start: 2022-05-10

## 2022-05-10 NOTE — PROGRESS NOTES
"     Office Note      Date: 05/10/2022  Patient Name: Abraham Mobley  MRN: [id number]  : 1945    Chief Complaint   Patient presents with   • Diabetes       History of Present Illness:   Abraham Mobley is a 76 y.o. male who presents today for follow up on type 2 diabetes.  Diabetes was diagnosed in .  Known diabetic complications: chronic kidney disease.  He remains on Novolog 70/30.  He is taking 12 units with breakfast and 12 units with dinner.  He is testing blood sugars 2 times per day.  Fasting blood sugars are <150.  Readings before supper are more variable, but usually often <150.  He denies any hypoglycemia.  Current exercise: None. Right knee pain.  Feet: No sores.  Foot exam up to date.  Eye exam current (within one year): yes, . No retinopathy.  ACE inhibitor/ARB: No, per nephrology.  Statin: Yes, atorvastatin.  He had labs recently with nephrology.  Creatinine 2.7, eGFR 24.      Subjective        Past Medical History:   Diagnosis Date   • Anemia    • Arthritis    • Cardiomyopathy    • Chronic kidney disease    • Gout    • Heart disease    • History of blood transfusion    • Hyperlipidemia    • Hypertension    • Kidney stones    • Obesity    • Osteoarthritis    • PUD (peptic ulcer disease)     GI bleed   • Renal cell carcinoma of right kidney    • Type 2 diabetes mellitus       Past Surgical History:   Procedure Laterality Date   • NEPHRECTOMY Right    • ADRENALECTOMY Left    • ANKLE FUSION Left    • COLONOSCOPY     • STOMACH SURGERY     • TOTAL KNEE ARTHROPLASTY Left    • VAGOTOMY AND PYLOROPLASTY         The following portions of the patient's history were reviewed and updated as appropriate: allergies, current medications, past family history, past medical history, past social history, past surgical history and problem list.    Objective     Vitals:    05/10/22 1324   BP: 138/82   Pulse: 68   SpO2: 96%   Weight: 118 kg (260 lb)   Height: 175.3 cm (69\")   PainSc: 0-No pain     Body mass index " is 38.4 kg/m².    Physical Exam  Vitals reviewed.   Constitutional:       General: He is not in acute distress.  Neurological:      Mental Status: He is alert and oriented to person, place, and time.   Psychiatric:         Mood and Affect: Affect normal.         HEMOGLOBIN A1C  Lab Results   Component Value Date    HGBA1C 6.2 05/10/2022    HGBA1C 6.2 01/10/2022    HGBA1C 6.2 06/26/2021     GLUCOSE  Lab Results   Component Value Date    POCGLU 159 (A) 05/10/2022       Current Outpatient Medications   Medication Instructions   • allopurinol (ZYLOPRIM) 300 MG tablet No dose, route, or frequency recorded.   • amLODIPine (NORVASC) 5 MG tablet No dose, route, or frequency recorded.   • aspirin 81 mg, Oral, Daily   • atorvastatin (LIPITOR) 40 MG tablet No dose, route, or frequency recorded.   • BD Pen Needle Chela 2nd Gen 32G X 4 MM misc USE  SUBCUTANEOUSLY TWICE DAILY TO  APPROPRIATE  AREA  AS  DIRECTED   • cholecalciferol (VITAMIN D3) 2,000 Units, Oral, Daily   • docusate sodium (COLACE) 100 mg, Oral, 2 Times Daily   • Ferrous Sulfate (IRON) 325 (65 Fe) MG tablet Oral   • Gvoke HypoPen 2-Pack 1 mg, Subcutaneous, As Needed   • hydrALAZINE (APRESOLINE) 50 mg, Oral, 2 Times Daily   • isosorbide mononitrate (IMDUR) 30 mg, Oral, Daily   • Lactobacillus (PROBIOTIC ACIDOPHILUS PO) Oral   • magnesium oxide (MAG-OX) 400 mg, Oral, Daily   • metOLazone (ZAROXOLYN) 5 MG tablet No dose, route, or frequency recorded.   • metoprolol tartrate (LOPRESSOR) 25 MG tablet No dose, route, or frequency recorded.   • Multiple Vitamin (MULTI VITAMIN DAILY PO) Oral   • NovoLOG Mix 70/30 FlexPen (70-30) 100 UNIT/ML suspension pen-injector injection Inject 12 units at breakfast and 12 units at supper   • OneTouch Ultra test strip 1 each, Other, 2 Times Daily, to check glucose.  Dx. E11.65   • tamsulosin (FLOMAX) 0.4 MG capsule 24 hr capsule 1 capsule, Oral, Daily       Assessment / Plan      Assessment & Plan:  1. Type 2 diabetes mellitus with  hyperglycemia, with long-term current use of insulin (HCC)  A1c okay.  Reviewed blood sugar readings and discussed with patient and his wife.  No known hypoglycemia.  Continue Novolog 70/30.  - POC Glucose, Blood  - POC Glycosylated Hemoglobin (Hb A1C)      Return in about 4 months (around 9/10/2022) for next scheduled follow up. He was advised to contact the office with any interval questions or concerns.    KRYSTINA Jimenez  Endocrinology  05/10/2022

## 2022-06-06 ENCOUNTER — TELEPHONE (OUTPATIENT)
Dept: ENDOCRINOLOGY | Facility: CLINIC | Age: 77
End: 2022-06-06

## 2022-06-06 NOTE — TELEPHONE ENCOUNTER
Called pt to let him know Teresa reviewed his glucose log and there is no changes. Pt verbalized understanding.

## 2022-07-01 ENCOUNTER — TELEPHONE (OUTPATIENT)
Dept: ENDOCRINOLOGY | Facility: CLINIC | Age: 77
End: 2022-07-01

## 2022-07-01 RX ORDER — BLOOD SUGAR DIAGNOSTIC
STRIP MISCELLANEOUS
Qty: 200 EACH | Refills: 3 | Status: SHIPPED | OUTPATIENT
Start: 2022-07-01 | End: 2022-07-01 | Stop reason: SDUPTHER

## 2022-07-01 RX ORDER — BLOOD SUGAR DIAGNOSTIC
1 STRIP MISCELLANEOUS 2 TIMES DAILY
Qty: 200 EACH | Refills: 3 | Status: SHIPPED | OUTPATIENT
Start: 2022-07-01 | End: 2023-01-11 | Stop reason: SDUPTHER

## 2022-07-01 NOTE — TELEPHONE ENCOUNTER
PHARMACY NEEDS DIAGNOSIS CODE FOR TEST STRIPS. PLEASE SEND NEW RX CONTAINING THE NEEDED CODE SO THEY CAN BILL MEDICARE.

## 2022-08-08 ENCOUNTER — TELEPHONE (OUTPATIENT)
Dept: ENDOCRINOLOGY | Facility: CLINIC | Age: 77
End: 2022-08-08

## 2022-08-08 NOTE — TELEPHONE ENCOUNTER
Spoke with patient regarding glucose levels.  Patient is aware that no changes are needed per Dr KADIE Oliveros.

## 2022-09-08 ENCOUNTER — TELEPHONE (OUTPATIENT)
Dept: ENDOCRINOLOGY | Facility: CLINIC | Age: 77
End: 2022-09-08

## 2022-09-08 NOTE — TELEPHONE ENCOUNTER
I called to  notify the patient that there was no changes in any dose.The patient verify and understood.

## 2022-09-12 ENCOUNTER — OFFICE VISIT (OUTPATIENT)
Dept: ENDOCRINOLOGY | Facility: CLINIC | Age: 77
End: 2022-09-12

## 2022-09-12 VITALS
DIASTOLIC BLOOD PRESSURE: 68 MMHG | WEIGHT: 262 LBS | HEART RATE: 71 BPM | BODY MASS INDEX: 38.8 KG/M2 | OXYGEN SATURATION: 98 % | SYSTOLIC BLOOD PRESSURE: 130 MMHG | HEIGHT: 69 IN

## 2022-09-12 DIAGNOSIS — Z79.4 TYPE 2 DIABETES MELLITUS WITH HYPERGLYCEMIA, WITH LONG-TERM CURRENT USE OF INSULIN: Primary | Chronic | ICD-10-CM

## 2022-09-12 DIAGNOSIS — E11.65 TYPE 2 DIABETES MELLITUS WITH HYPERGLYCEMIA, WITH LONG-TERM CURRENT USE OF INSULIN: Primary | Chronic | ICD-10-CM

## 2022-09-12 LAB
EXPIRATION DATE: ABNORMAL
EXPIRATION DATE: NORMAL
GLUCOSE BLDC GLUCOMTR-MCNC: 186 MG/DL (ref 70–130)
HBA1C MFR BLD: 6.1 %
Lab: ABNORMAL
Lab: NORMAL

## 2022-09-12 PROCEDURE — 3044F HG A1C LEVEL LT 7.0%: CPT | Performed by: PHYSICIAN ASSISTANT

## 2022-09-12 PROCEDURE — 99213 OFFICE O/P EST LOW 20 MIN: CPT | Performed by: PHYSICIAN ASSISTANT

## 2022-09-12 PROCEDURE — 83036 HEMOGLOBIN GLYCOSYLATED A1C: CPT | Performed by: PHYSICIAN ASSISTANT

## 2022-09-12 PROCEDURE — 82947 ASSAY GLUCOSE BLOOD QUANT: CPT | Performed by: PHYSICIAN ASSISTANT

## 2022-09-12 RX ORDER — HYDRALAZINE HYDROCHLORIDE 50 MG/1
TABLET, FILM COATED ORAL EVERY 12 HOURS SCHEDULED
COMMUNITY
End: 2022-09-12

## 2022-09-12 RX ORDER — INSULIN ASPART 100 [IU]/ML
INJECTION, SUSPENSION SUBCUTANEOUS
Qty: 30 ML | Refills: 3 | Status: SHIPPED | OUTPATIENT
Start: 2022-09-12 | End: 2023-01-17

## 2022-09-12 NOTE — PROGRESS NOTES
Office Note      Date: 2022  Patient Name: Abraham Mobley  MRN: [id number]  : 1945    Chief Complaint   Patient presents with   • Diabetes     History of Present Illness:   Abraham Mobley is a 77 y.o. male who presents today for follow up on type 2 diabetes.  Diabetes was diagnosed in .  Known diabetic complications: chronic kidney disease.  Current diabetic medications: Novolog 70/30.  He is testing blood sugars twice per day.  Fasting readings are often <150, but some up to 160s.  Readings before dinner variable from 111-185, typically mid 100s.  He denies any hypoglycemia.  Lowest blood sugar has been 74.  This was after eating dinner.  He reports having snack around 8 PM.  This is usually ice cream.  He is not exercising.  He reports shortness of breath with activity - unchanged.  He is followed by cardiology.  He reports being told that his heart function was fine.  Feet: No sores or other issues.  Foot exam up to date.  Last eye exam:  - scheduled for next week. No retinopathy.  ACE inhibitor/ARB: No.  Statin: Atorvastatin.  CKD, stage 4 - followed by nephrology.  He has history of renal cell cancer, s/p right nephrectomy and right adrenalectomy.  He reports that urologist is now Dr. Nirav Haq.  He reports being told that there are cysts on left kidney.  He reports that he is scheduled for CT scan in 2022.    Subjective      Review of Systems   Constitutional: Negative.   Cardiovascular: Positive for dyspnea on exertion.   Endocrine: Negative.    Gastrointestinal: Negative.      Past Medical History:   Diagnosis Date   • Anemia    • Arthritis    • Cardiomyopathy    • Chronic kidney disease    • Gout    • Heart disease    • History of blood transfusion    • Hyperlipidemia    • Hypertension    • Kidney stones    • Obesity    • Osteoarthritis    • PUD (peptic ulcer disease)     GI bleed   • Renal cell carcinoma of right kidney    • Type 2 diabetes mellitus       Past Surgical History:  "  Procedure Laterality Date   • NEPHRECTOMY Right 2016   • ADRENALECTOMY Left 2021   • ANKLE FUSION Left    • COLONOSCOPY     • STOMACH SURGERY     • TOTAL KNEE ARTHROPLASTY Left    • VAGOTOMY AND PYLOROPLASTY       The following portions of the patient's history were reviewed and updated as appropriate: allergies, current medications, past family history, past medical history, past social history, past surgical history and problem list is.    Objective     Vitals:    09/12/22 1254   BP: 130/68   Pulse: 71   SpO2: 98%   Weight: 119 kg (262 lb)   Height: 175.3 cm (69\")   Body mass index is 38.69 kg/m².    Physical Exam  Vitals reviewed.   Constitutional:       General: He is not in acute distress.  Neurological:      Mental Status: He is alert and oriented to person, place, and time.   Psychiatric:         Mood and Affect: Affect normal.       HEMOGLOBIN A1C  Lab Results   Component Value Date    HGBA1C 6.1 09/12/2022    HGBA1C 6.2 05/10/2022    HGBA1C 6.2 01/10/2022     GLUCOSE  Lab Results   Component Value Date    POCGLU 186 (A) 09/12/2022       Current Outpatient Medications   Medication Instructions   • allopurinol (ZYLOPRIM) 300 MG tablet No dose, route, or frequency recorded.   • amLODIPine (NORVASC) 5 MG tablet No dose, route, or frequency recorded.   • aspirin 81 mg, Oral, Daily   • atorvastatin (LIPITOR) 40 MG tablet No dose, route, or frequency recorded.   • BD Pen Needle Chela 2nd Gen 32G X 4 MM misc USE  SUBCUTANEOUSLY TWICE DAILY TO  APPROPRIATE  AREA  AS  DIRECTED   • cholecalciferol (VITAMIN D3) 2,000 Units, Oral, Daily   • Ferrous Sulfate (IRON) 325 (65 Fe) MG tablet Oral   • Gvoke HypoPen 2-Pack 1 mg, Subcutaneous, As Needed   • hydrALAZINE (APRESOLINE) 50 mg, Oral, 2 Times Daily   • isosorbide mononitrate (IMDUR) 30 mg, Oral, Daily   • Lactobacillus (PROBIOTIC ACIDOPHILUS PO) Oral   • magnesium oxide (MAG-OX) 400 mg, Oral, Daily   • metOLazone (ZAROXOLYN) 5 MG tablet No dose, route, or frequency " recorded.   • metoprolol tartrate (LOPRESSOR) 25 MG tablet No dose, route, or frequency recorded.   • Multiple Vitamin (MULTI VITAMIN DAILY PO) Oral   • NovoLOG Mix 70/30 FlexPen (70-30) 100 UNIT/ML suspension pen-injector injection Inject 12 units at breakfast and 12 units at supper; titrate to max 30 units daily   • OneTouch Ultra test strip 1 each, Other, 2 Times Daily, to check glucose twice daily.  Dx E11.65   • tamsulosin (FLOMAX) 0.4 MG capsule 24 hr capsule 1 capsule, Oral, Daily       Assessment / Plan      Assessment & Plan:  1. Type 2 diabetes mellitus with hyperglycemia, with long-term current use of insulin (HCC)  A1c okay.  Blood sugars are variable, but reasonable overall.  Want to avoid hypoglycemia.  Continue current doses of Novolog 70/30.  We discussed testing to see how meals are affecting blood sugar.  Encouraged to increase physical activity.  Encouraged nutritious dietary choices, moderation of carbohydrates, avoidance of added sugar.  BP okay.  - POC Glucose, Blood  - POC Glycosylated Hemoglobin (Hb A1C)  - NovoLOG Mix 70/30 FlexPen (70-30) 100 UNIT/ML suspension pen-injector injection; Inject 12 units at breakfast and 12 units at supper; titrate to max 30 units daily  Dispense: 30 mL; Refill: 3      Return in about 4 months (around 1/12/2023) for recheck with A1c and foot exam. He was advised to contact the office with any interval questions or concerns.    KRYSTINA Jimenez  Endocrinology  09/12/2022

## 2022-10-07 ENCOUNTER — TELEPHONE (OUTPATIENT)
Dept: ENDOCRINOLOGY | Facility: CLINIC | Age: 77
End: 2022-10-07

## 2022-11-10 ENCOUNTER — TELEPHONE (OUTPATIENT)
Dept: ENDOCRINOLOGY | Facility: CLINIC | Age: 77
End: 2022-11-10

## 2022-11-10 NOTE — TELEPHONE ENCOUNTER
Spoke with patient RE glucose readings.  Patient understands to increase insulin at breakfast from 12 units to 14 units and to continue the 12 units at supper

## 2022-12-07 ENCOUNTER — TELEPHONE (OUTPATIENT)
Dept: ENDOCRINOLOGY | Facility: CLINIC | Age: 77
End: 2022-12-07

## 2023-01-09 ENCOUNTER — TELEPHONE (OUTPATIENT)
Dept: ENDOCRINOLOGY | Facility: CLINIC | Age: 78
End: 2023-01-09
Payer: MEDICARE

## 2023-01-11 RX ORDER — BLOOD SUGAR DIAGNOSTIC
1 STRIP MISCELLANEOUS 2 TIMES DAILY
Qty: 200 EACH | Refills: 3 | Status: SHIPPED | OUTPATIENT
Start: 2023-01-11

## 2023-01-11 RX ORDER — PEN NEEDLE, DIABETIC 32GX 5/32"
NEEDLE, DISPOSABLE MISCELLANEOUS
Qty: 100 EACH | Refills: 0 | Status: SHIPPED | OUTPATIENT
Start: 2023-01-11 | End: 2023-03-17

## 2023-01-17 ENCOUNTER — OFFICE VISIT (OUTPATIENT)
Dept: ENDOCRINOLOGY | Facility: CLINIC | Age: 78
End: 2023-01-17
Payer: MEDICARE

## 2023-01-17 VITALS
WEIGHT: 261.2 LBS | BODY MASS INDEX: 38.69 KG/M2 | SYSTOLIC BLOOD PRESSURE: 130 MMHG | OXYGEN SATURATION: 96 % | HEIGHT: 69 IN | DIASTOLIC BLOOD PRESSURE: 82 MMHG | HEART RATE: 85 BPM

## 2023-01-17 DIAGNOSIS — E11.65 TYPE 2 DIABETES MELLITUS WITH HYPERGLYCEMIA, WITH LONG-TERM CURRENT USE OF INSULIN: Primary | Chronic | ICD-10-CM

## 2023-01-17 DIAGNOSIS — Z79.4 TYPE 2 DIABETES MELLITUS WITH HYPERGLYCEMIA, WITH LONG-TERM CURRENT USE OF INSULIN: Primary | Chronic | ICD-10-CM

## 2023-01-17 DIAGNOSIS — E66.01 CLASS 2 SEVERE OBESITY DUE TO EXCESS CALORIES WITH SERIOUS COMORBIDITY AND BODY MASS INDEX (BMI) OF 38.0 TO 38.9 IN ADULT: Chronic | ICD-10-CM

## 2023-01-17 LAB — HBA1C MFR BLD: 6.7 %

## 2023-01-17 PROCEDURE — 99214 OFFICE O/P EST MOD 30 MIN: CPT | Performed by: PHYSICIAN ASSISTANT

## 2023-01-17 RX ORDER — INSULIN ASPART 100 [IU]/ML
INJECTION, SUSPENSION SUBCUTANEOUS
Qty: 30 ML | Refills: 3 | Status: CANCELLED | OUTPATIENT
Start: 2023-01-17

## 2023-01-17 RX ORDER — INSULIN ASPART 100 [IU]/ML
INJECTION, SUSPENSION SUBCUTANEOUS
Start: 2023-01-17 | End: 2023-03-09

## 2023-01-17 NOTE — PROGRESS NOTES
Office Note      Date: 2023  Patient Name: Abraham Mobley  MRN: [id number]  : 1945    Chief Complaint   Patient presents with   • Diabetes     History of Present Illness:   Abraham Mobley is a 77 y.o. male who presents today for follow up on type 2 diabetes.  Diabetes was diagnosed in .  Known diabetic complications: Chronic kidney disease.  Current diabetic medications: NovoLog 70/30.  Current doses are 14 units with breakfast and 12 units with supper.  He is testing blood sugars 2 times per day.  Fasting blood sugars typically <150, vary from 118-164.  Readings before supper are more variable from 120-207.  He denies any recent hypoglycemia.  Feet: No sores.  Foot exam today.  Last eye exam: ~2022.  ACE inhibitor/ARB: No.  Statin: Atorvastatin.  He is followed by nephrology (Dr. Andrea) for CKD, stage 4.  He had labs on 2023 that showed creatinine 2.3, eGFR 29.  A1c was 6.7%.  History of renal cell cancer, s/p right nephrectomy and left adrenalectomy.  They are monitoring cysts in left kidney.  One high attenuation cyst appeared mildly larger on CT 2022.  Plan for repeat CT after 6 months.  Urologist is Dr. Nirav Haq.  He reports fall in November.  He fell backward on brick steps and landed on concrete.  He reports that he did not seek evaluation, but thinks that he fractured ribs on the left and fractured right great toe.    Subjective      Review of Systems   Constitutional: Negative.    Cardiovascular: Negative.    Endocrine: Negative.      Past Medical History:   Diagnosis Date   • Anemia    • Arthritis    • Cardiomyopathy    • Chronic kidney disease    • Gout    • Heart disease    • History of blood transfusion    • Hyperlipidemia    • Hypertension    • Kidney stones    • Obesity    • Osteoarthritis    • PUD (peptic ulcer disease)     GI bleed   • Renal cell carcinoma of right kidney    • Type 2 diabetes mellitus       Past Surgical History:   Procedure Laterality Date   •  "NEPHRECTOMY Right 2016   • ADRENALECTOMY Left 2021   • ANKLE FUSION Left    • COLONOSCOPY     • STOMACH SURGERY     • TOTAL KNEE ARTHROPLASTY Left    • VAGOTOMY AND PYLOROPLASTY       The following portions of the patient's history were reviewed and updated as appropriate: allergies, current medications, past family history, past medical history, past social history, past surgical history and problem list.    Objective     Vitals:    01/17/23 1315   BP: 130/82   Pulse: 85   SpO2: 96%   Weight: 118 kg (261 lb 3.2 oz)   Height: 175.3 cm (69\")   PainSc: 0-No pain   Body mass index is 38.57 kg/m².    Physical Exam  Vitals reviewed.   Constitutional:       General: He is not in acute distress.  Cardiovascular:      Pulses:           Dorsalis pedis pulses are 2+ on the right side and 2+ on the left side.        Posterior tibial pulses are 2+ on the right side and 2+ on the left side.   Musculoskeletal:      Right foot: Decreased range of motion (1st toe). Deformity present.      Left foot: No deformity.   Feet:      Right foot:      Protective Sensation: 8 sites tested. 6 sites sensed.      Skin integrity: No ulcer or skin breakdown.      Left foot:      Protective Sensation: 8 sites tested. 7 sites sensed.      Skin integrity: No ulcer or skin breakdown.      Comments: Vib sensation absent right foot, diminished left foot.  Diabetic Foot Exam Performed and Monofilament Test Performed  Neurological:      Mental Status: He is alert and oriented to person, place, and time.   Psychiatric:         Mood and Affect: Affect normal.         HEMOGLOBIN A1C  Lab Results   Component Value Date    HGBA1C 6.7 01/12/2023    HGBA1C 6.1 09/12/2022    HGBA1C 6.2 05/10/2022       Current Outpatient Medications   Medication Instructions   • allopurinol (ZYLOPRIM) 300 MG tablet No dose, route, or frequency recorded.   • amLODIPine (NORVASC) 5 mg, Oral, Daily   • aspirin 81 mg, Oral, Daily   • atorvastatin (LIPITOR) 40 MG tablet No dose, " route, or frequency recorded.   • BD Pen Needle Chela 2nd Gen 32G X 4 MM misc USE SUBCUTANEOUSLY TWICE DAILY TO APPROPRIATE AREA AS DIRECTED   • cholecalciferol (VITAMIN D3) 2,000 Units, Oral, Daily   • Ferrous Sulfate (IRON) 325 (65 Fe) MG tablet Oral   • Gvoke HypoPen 2-Pack 1 mg, Subcutaneous, As Needed   • hydrALAZINE (APRESOLINE) 50 mg, Oral, 2 Times Daily   • isosorbide mononitrate (IMDUR) 30 mg, Oral, Daily   • Lactobacillus (PROBIOTIC ACIDOPHILUS PO) Oral   • magnesium oxide (MAG-OX) 400 mg, Oral, Daily   • metOLazone (ZAROXOLYN) 5 MG tablet No dose, route, or frequency recorded.   • metoprolol tartrate (LOPRESSOR) 25 MG tablet No dose, route, or frequency recorded.   • Multiple Vitamin (MULTI VITAMIN DAILY PO) Oral   • NovoLOG Mix 70/30 FlexPen (70-30) 100 UNIT/ML suspension pen-injector injection Inject 14 units at breakfast and 12 units at supper; titrate to max 30 units daily   • OneTouch Ultra test strip 1 each, Other, 2 Times Daily, to check glucose twice daily.  Dx E11.65 on insulin   • tamsulosin (FLOMAX) 0.4 MG capsule 24 hr capsule 1 capsule, Oral, Daily       Assessment / Plan      Assessment & Plan:  1. Type 2 diabetes mellitus with hyperglycemia, with long-term current use of insulin ([facility])  A1c okay at 6.7%.  No known hypoglycemia.  Continue mix insulin.  He would like to try a GLP-1 RA.  Potential side effects reviewed.  Discussed plan with Dr. Harpreet Oliveros.  - NovoLOG Mix 70/30 FlexPen (70-30) 100 UNIT/ML suspension pen-injector injection; Inject 14 units at breakfast and 12 units at supper; titrate to max 30 units daily    2. Class 2 severe obesity due to excess calories with serious comorbidity and body mass index (BMI) of 38.0 to 38.9 in adult ([facility])  Encouraged nutritious dietary choices and physical activity as tolerated.  Adding GLP-1 RA should help with weight loss efforts.      Return in about 3 months (around 4/17/2023) for next scheduled follow up. He was advised to contact the office  with any interval questions or concerns.    KRYSTINA Jimenez  Endocrinology  01/17/2023

## 2023-01-29 PROBLEM — Z79.4 LONG-TERM INSULIN USE: Status: RESOLVED | Noted: 2020-11-12 | Resolved: 2023-01-29

## 2023-03-09 ENCOUNTER — TELEPHONE (OUTPATIENT)
Dept: ENDOCRINOLOGY | Facility: CLINIC | Age: 78
End: 2023-03-09
Payer: MEDICARE

## 2023-03-09 DIAGNOSIS — E11.65 TYPE 2 DIABETES MELLITUS WITH HYPERGLYCEMIA, WITH LONG-TERM CURRENT USE OF INSULIN: Chronic | ICD-10-CM

## 2023-03-09 DIAGNOSIS — Z79.4 TYPE 2 DIABETES MELLITUS WITH HYPERGLYCEMIA, WITH LONG-TERM CURRENT USE OF INSULIN: Chronic | ICD-10-CM

## 2023-03-09 RX ORDER — INSULIN ASPART 100 [IU]/ML
INJECTION, SUSPENSION SUBCUTANEOUS
Start: 2023-03-09

## 2023-03-09 NOTE — TELEPHONE ENCOUNTER
Left message for pt to return call - jrb reviewed bs's -increase insulin before breakfast to 16 units.  Continue before supper at 12

## 2023-03-17 RX ORDER — PEN NEEDLE, DIABETIC 32GX 5/32"
NEEDLE, DISPOSABLE MISCELLANEOUS
Qty: 100 EACH | Refills: 2 | Status: SHIPPED | OUTPATIENT
Start: 2023-03-17

## 2023-04-13 ENCOUNTER — TELEPHONE (OUTPATIENT)
Dept: ENDOCRINOLOGY | Facility: CLINIC | Age: 78
End: 2023-04-13
Payer: MEDICARE

## 2023-04-13 DIAGNOSIS — Z79.4 TYPE 2 DIABETES MELLITUS WITH HYPERGLYCEMIA, WITH LONG-TERM CURRENT USE OF INSULIN: Chronic | ICD-10-CM

## 2023-04-13 DIAGNOSIS — E11.65 TYPE 2 DIABETES MELLITUS WITH HYPERGLYCEMIA, WITH LONG-TERM CURRENT USE OF INSULIN: Chronic | ICD-10-CM

## 2023-04-13 RX ORDER — INSULIN ASPART 100 [IU]/ML
INJECTION, SUSPENSION SUBCUTANEOUS
Start: 2023-04-13

## 2023-04-13 NOTE — TELEPHONE ENCOUNTER
Spoke to pt-per belkis:  She reviewed bs's increase before breakfast insulin to 18 units and before supper to 14 units.  He voiced understanding

## 2023-05-10 ENCOUNTER — TELEPHONE (OUTPATIENT)
Dept: ENDOCRINOLOGY | Facility: CLINIC | Age: 78
End: 2023-05-10
Payer: MEDICARE

## 2023-05-10 RX ORDER — BLOOD-GLUCOSE METER
EACH MISCELLANEOUS
Qty: 1 EACH | Refills: 0 | Status: SHIPPED | OUTPATIENT
Start: 2023-05-10

## 2023-05-10 NOTE — TELEPHONE ENCOUNTER
[facility] pharmacy called, stated that this patient is needing a new one touch meter in order to use his one touch test strips. Stated that that his meter broke. Thank you!

## 2023-07-10 ENCOUNTER — TELEPHONE (OUTPATIENT)
Dept: ENDOCRINOLOGY | Facility: CLINIC | Age: 78
End: 2023-07-10

## 2023-07-21 NOTE — TELEPHONE ENCOUNTER
[facility] IS NEEDING NEW PRESCRIPTION FOR TEST STRIPS WITH DIAGNOSIS CODE ON THE PRESCRIPTION.

## 2023-07-24 RX ORDER — BLOOD SUGAR DIAGNOSTIC
1 STRIP MISCELLANEOUS 2 TIMES DAILY
Qty: 200 EACH | Refills: 3 | Status: SHIPPED | OUTPATIENT
Start: 2023-07-24

## 2023-07-26 ENCOUNTER — TELEPHONE (OUTPATIENT)
Dept: ENDOCRINOLOGY | Facility: CLINIC | Age: 78
End: 2023-07-26

## 2023-07-26 ENCOUNTER — TELEPHONE (OUTPATIENT)
Dept: ENDOCRINOLOGY | Facility: CLINIC | Age: 78
End: 2023-07-26
Payer: MEDICARE

## 2023-07-26 NOTE — TELEPHONE ENCOUNTER
Pt called, said he was speaking with (someone?) wasn't sure who, talking about his Ozpemic, it's giving him bad diarrhea, and he's said he's going to stop taking it because of this. He would like to speak with someone from clinical about this.

## 2023-07-26 NOTE — TELEPHONE ENCOUNTER
Spoke with patient.  He reports diarrhea is improving.  Last injection of Ozempic was 10 days ago.  He will remain off the Ozempic.  Recommend Novolog 70/30 doses 6 units with breakfast and 4 units with dinner for now.  If blood sugars start to increase and are staying over 150, will increase insulin.  Advised to call the office/send in blood sugar readings as needed for help with insulin adjustments.

## 2023-07-26 NOTE — TELEPHONE ENCOUNTER
Spoke with patient.  He states that he has stopped the Ozempic.  He was waiting on a phone call back at the beginning of the week but did not hear so he has stopped it due to diarrhea.  He is wanting to know how much of the Novolog he should be taking.  Before starting the Ozempic, he was taking 19 units in the morning and 15 units at night.  Since stopping the Ozempic, he has been basing it on his blood sugar readings.  Blood sugar this morning was 115 and he took 6 units.  He has not checked his blood sugar since.  Only checks blood sugar twice daily.

## 2023-08-11 RX ORDER — PEN NEEDLE, DIABETIC 32GX 5/32"
NEEDLE, DISPOSABLE MISCELLANEOUS
Qty: 100 EACH | Refills: 11 | Status: SHIPPED | OUTPATIENT
Start: 2023-08-11

## 2023-08-11 NOTE — TELEPHONE ENCOUNTER
Rx Refill Note  Requested Prescriptions      No prescriptions requested or ordered in this encounter        Last office visit with prescribing clinician: 6/6/23     Next office visit with prescribing clinician: 1/4/2024       Tami Pereira (Jodi)  08/11/23, 14:16 EDT

## 2023-09-07 ENCOUNTER — TELEPHONE (OUTPATIENT)
Dept: ENDOCRINOLOGY | Facility: CLINIC | Age: 78
End: 2023-09-07
Payer: MEDICARE

## 2023-09-07 NOTE — TELEPHONE ENCOUNTER
Left message for pt to return call:  per wrm he reviewed bs's.  He recommends using 12 units bid of 70/30 insulin for now

## 2023-09-25 DIAGNOSIS — Z79.4 TYPE 2 DIABETES MELLITUS WITH HYPERGLYCEMIA, WITH LONG-TERM CURRENT USE OF INSULIN: Chronic | ICD-10-CM

## 2023-09-25 DIAGNOSIS — E11.65 TYPE 2 DIABETES MELLITUS WITH HYPERGLYCEMIA, WITH LONG-TERM CURRENT USE OF INSULIN: Chronic | ICD-10-CM

## 2023-09-25 RX ORDER — INSULIN ASPART 100 [IU]/ML
INJECTION, SUSPENSION SUBCUTANEOUS
Qty: 45 ML | Refills: 3 | Status: SHIPPED | OUTPATIENT
Start: 2023-09-25

## 2023-09-25 NOTE — TELEPHONE ENCOUNTER
Caller: Angelique Mobley    Relationship: Emergency Contact    Best call back number: [phone]    Requested Prescriptions:   NovoLOG Mix 70/30 FlexPen (70-30) 100 UNIT/ML suspension pen-injector injection [[id number]] (Order [id number])     Pharmacy where request should be sent:    [facility] [address] [place] [address]     Next office visit with prescribing clinician: 1/4/2024     Additional details provided by patient: PATIENT IS ALMOST OUT AND [facility] STATED THEY NEEDED A NEW ORDER.     Does the patient have less than a 3 day supply:  [x] Yes  [] No    Would you like a call back once the refill request has been completed: [] Yes [x] No    If the office needs to give you a call back, can they leave a voicemail: [x] Yes [] No

## 2023-09-25 NOTE — TELEPHONE ENCOUNTER
Rx Refill Note  Requested Prescriptions     Pending Prescriptions Disp Refills    NovoLOG Mix 70/30 FlexPen (70-30) 100 UNIT/ML suspension pen-injector injection 45 mL 1     Sig: Inject 18 units at breakfast and 14 units at supper; titrate to max 50 units daily        Last office visit with prescribing clinician: 6/6/23     Next office visit with prescribing clinician: 1/4/2024       Tami Pereira (Jodi)  09/25/23, 09:32 EDT

## 2023-10-06 ENCOUNTER — TELEPHONE (OUTPATIENT)
Dept: ENDOCRINOLOGY | Facility: CLINIC | Age: 78
End: 2023-10-06
Payer: MEDICARE

## 2023-11-13 ENCOUNTER — TELEPHONE (OUTPATIENT)
Dept: ENDOCRINOLOGY | Facility: CLINIC | Age: 78
End: 2023-11-13
Payer: MEDICARE

## 2023-11-13 DIAGNOSIS — Z79.4 TYPE 2 DIABETES MELLITUS WITH HYPERGLYCEMIA, WITH LONG-TERM CURRENT USE OF INSULIN: Chronic | ICD-10-CM

## 2023-11-13 DIAGNOSIS — E11.65 TYPE 2 DIABETES MELLITUS WITH HYPERGLYCEMIA, WITH LONG-TERM CURRENT USE OF INSULIN: Chronic | ICD-10-CM

## 2023-11-13 RX ORDER — INSULIN ASPART 100 [IU]/ML
INJECTION, SUSPENSION SUBCUTANEOUS
Start: 2023-11-13

## 2023-11-13 NOTE — TELEPHONE ENCOUNTER
Spoke to pt per wrm:  he reviewed bs's increase before breakfast insulin to 14 units.  Continue 12 before supper  He voiced understanding

## 2024-01-04 ENCOUNTER — OFFICE VISIT (OUTPATIENT)
Dept: ENDOCRINOLOGY | Facility: CLINIC | Age: 79
End: 2024-01-04
Payer: MEDICARE

## 2024-01-04 VITALS
HEART RATE: 56 BPM | SYSTOLIC BLOOD PRESSURE: 122 MMHG | OXYGEN SATURATION: 98 % | BODY MASS INDEX: 36.79 KG/M2 | DIASTOLIC BLOOD PRESSURE: 62 MMHG | HEIGHT: 70 IN | WEIGHT: 257 LBS

## 2024-01-04 DIAGNOSIS — E11.65 TYPE 2 DIABETES MELLITUS WITH HYPERGLYCEMIA, WITH LONG-TERM CURRENT USE OF INSULIN: Primary | Chronic | ICD-10-CM

## 2024-01-04 DIAGNOSIS — N18.9 CHRONIC RENAL FAILURE, UNSPECIFIED CKD STAGE: ICD-10-CM

## 2024-01-04 DIAGNOSIS — Z79.4 TYPE 2 DIABETES MELLITUS WITH HYPOGLYCEMIA WITHOUT COMA, WITH LONG-TERM CURRENT USE OF INSULIN: ICD-10-CM

## 2024-01-04 DIAGNOSIS — E78.00 PURE HYPERCHOLESTEROLEMIA: ICD-10-CM

## 2024-01-04 DIAGNOSIS — Z79.4 TYPE 2 DIABETES MELLITUS WITH HYPERGLYCEMIA, WITH LONG-TERM CURRENT USE OF INSULIN: Primary | Chronic | ICD-10-CM

## 2024-01-04 DIAGNOSIS — E11.649 TYPE 2 DIABETES MELLITUS WITH HYPOGLYCEMIA WITHOUT COMA, WITH LONG-TERM CURRENT USE OF INSULIN: ICD-10-CM

## 2024-01-04 DIAGNOSIS — I10 BENIGN HYPERTENSION: ICD-10-CM

## 2024-01-04 LAB
EXPIRATION DATE: ABNORMAL
EXPIRATION DATE: NORMAL
GLUCOSE BLDC GLUCOMTR-MCNC: 110 MG/DL (ref 70–130)
HBA1C MFR BLD: 6.5 % (ref 4.5–5.7)
Lab: ABNORMAL
Lab: NORMAL

## 2024-01-04 NOTE — PROGRESS NOTES
"     Office Note      Date: 2024  Patient Name: Abraham Mobley  MRN: [id number]  : 1945    Chief Complaint   Patient presents with    Diabetes     Uncontrolled type 2 diabetes mellitus with hyperglycemia         History of Present Illness:   Abraham Mobley is a 78 y.o. male who presents for Diabetes type 2. Diagnosed in: . Treated in past with insulin. He didn't tolerate Ozempic due to GI upset.  Current treatments: premix insulin. Number of insulin shots per day: 2. Checks blood sugar 2 times a day. Has low blood sugar: none. Aspirin use: Yes. Statin use: Yes. ACE-I/ARB use: No - per nephrology. Changes in health since last visit: none. Last eye exam 2023.     Subjective      Diabetic Complications:  Eyes: No  Kidneys: Yes - CRI  Feet: No  Heart: No    Diet and Exercise:  Meals per day: 3  Minutes of exercise per week: 0 mins.    Review of Systems:   Review of Systems   Constitutional: Negative.    Cardiovascular: Negative.    Gastrointestinal: Negative.    Endocrine: Negative.        The following portions of the patient's history were reviewed and updated as appropriate: allergies, current medications, past family history, past medical history, past social history, past surgical history, and problem list.    Objective     Visit Vitals  /62 (BP Location: Right arm, Patient Position: Sitting, Cuff Size: Adult)   Pulse 56   Ht 177.8 cm (70\")   Wt 117 kg (257 lb)   SpO2 98%   BMI 36.88 kg/m²       Physical Exam:  Physical Exam  Constitutional:       Appearance: Normal appearance.   Neurological:      Mental Status: He is alert.         Labs:    HbA1c  Lab Results   Component Value Date    HGBA1C 6.5 (A) 2024       CMP  No results found for: \"GLUCOSE\", \"BUN\", \"CREATININE\", \"EGFRIFNONA\", \"EGFRIFAFRI\", \"BCR\", \"K\", \"CO2\", \"CALCIUM\", \"PROTENTOTREF\", \"LABIL2\", \"BILIRUBIN\", \"AST\", \"ALT\"     Lipid Panel        TSH  No results found for: \"TSH\", \"FREET4\"     Hemoglobin A1C  Lab Results   Component Value " "Date    HGBA1C 6.5 (A) 01/04/2024        Microalbumin/Creatinine  No results found for: \"MALBCRERATIO\", \"CREATINIURIN\", \"MICROALBUR\"        Assessment / Plan      Assessment & Plan:  Diagnoses and all orders for this visit:    1. Type 2 diabetes mellitus with hyperglycemia, with long-term current use of insulin (Primary)  Assessment & Plan:  Diabetes is unchanged.   Continue current treatment regimen.  Diabetes will be reassessed in 3 months.    Orders:  -     POC Glycosylated Hemoglobin (Hb A1C)  -     POC Glucose, Blood    2. Type 2 diabetes mellitus with hypoglycemia without coma, with long-term current use of insulin  Assessment & Plan:  No lows recently.        3. Benign hypertension  Assessment & Plan:  Hypertension is unchanged.  Continue current treatment regimen.  Blood pressure will be reassessed at the next regular appointment.      4. Pure hypercholesterolemia  Assessment & Plan:  Continue statin.      5. Chronic renal failure, unspecified CKD stage  Assessment & Plan:  Continue nephrology follow up.        Current Outpatient Medications   Medication Instructions    allopurinol (ZYLOPRIM) 300 MG tablet No dose, route, or frequency recorded.    amLODIPine (NORVASC) 5 mg, Oral, Daily    aspirin 81 mg, Oral, Daily    atorvastatin (LIPITOR) 40 MG tablet No dose, route, or frequency recorded.    BD Pen Needle Chela 2nd Gen 32G X 4 MM misc Use to inject insulin 2 times daily    Blood Glucose Monitoring Suppl (ONE TOUCH ULTRA 2) w/Device kit Use to test blood sugar twice daily.  Dx E11.65    cholecalciferol (VITAMIN D3) 2,000 Units, Oral, Daily    Ferrous Sulfate (IRON) 325 (65 Fe) MG tablet Oral    Gvoke HypoPen 2-Pack 1 mg, Subcutaneous, As Needed    hydrALAZINE (APRESOLINE) 50 mg, Oral, 2 Times Daily    isosorbide mononitrate (IMDUR) 30 mg, Oral, Daily    Lactobacillus (PROBIOTIC ACIDOPHILUS PO) Oral    magnesium oxide (MAG-OX) 400 mg, Oral, Daily    metOLazone (ZAROXOLYN) 5 MG tablet No dose, route, or " frequency recorded.    metoprolol tartrate (LOPRESSOR) 25 MG tablet No dose, route, or frequency recorded.    Multiple Vitamin (MULTI VITAMIN DAILY PO) Oral    NovoLOG Mix 70/30 FlexPen (70-30) 100 UNIT/ML suspension pen-injector injection Inject 14 units at breakfast and 12 units at supper; titrate to max 50 units daily    OneTouch Ultra test strip 1 each, Other, 2 Times Daily, to check glucose twice daily.  Dx E11.65 on insulin    SODIUM BICARBONATE PO Oral, 3 Times Daily    tamsulosin (FLOMAX) 0.4 MG capsule 24 hr capsule 1 capsule, Oral, Daily      Return in about 3 months (around 4/4/2024) for Recheck with A1c.    Harpreet Oliveros MD   01/04/2024

## 2024-02-06 RX ORDER — BLOOD SUGAR DIAGNOSTIC
1 STRIP MISCELLANEOUS 2 TIMES DAILY
Qty: 200 EACH | Refills: 3 | Status: SHIPPED | OUTPATIENT
Start: 2024-02-06

## 2024-02-13 ENCOUNTER — TELEPHONE (OUTPATIENT)
Dept: ENDOCRINOLOGY | Facility: CLINIC | Age: 79
End: 2024-02-13
Payer: MEDICARE

## 2024-03-07 ENCOUNTER — TELEPHONE (OUTPATIENT)
Dept: ENDOCRINOLOGY | Facility: CLINIC | Age: 79
End: 2024-03-07
Payer: MEDICARE

## 2024-03-07 DIAGNOSIS — E11.65 TYPE 2 DIABETES MELLITUS WITH HYPERGLYCEMIA, WITH LONG-TERM CURRENT USE OF INSULIN: Chronic | ICD-10-CM

## 2024-03-07 DIAGNOSIS — Z79.4 TYPE 2 DIABETES MELLITUS WITH HYPERGLYCEMIA, WITH LONG-TERM CURRENT USE OF INSULIN: Chronic | ICD-10-CM

## 2024-03-07 RX ORDER — INSULIN ASPART 100 [IU]/ML
INJECTION, SUSPENSION SUBCUTANEOUS
Start: 2024-03-07

## 2024-03-07 NOTE — TELEPHONE ENCOUNTER
SPOKE TO PT PER DR GARCIA HE REVIEWED BS'S INCREASE BEFORE BREAKFAST INSULIN TO 15 UNITS.  HE VOICED UNDERSTANDING

## 2024-04-09 ENCOUNTER — TELEPHONE (OUTPATIENT)
Dept: ENDOCRINOLOGY | Facility: CLINIC | Age: 79
End: 2024-04-09
Payer: MEDICARE

## 2024-04-09 DIAGNOSIS — E11.65 TYPE 2 DIABETES MELLITUS WITH HYPERGLYCEMIA, WITH LONG-TERM CURRENT USE OF INSULIN: Chronic | ICD-10-CM

## 2024-04-09 DIAGNOSIS — Z79.4 TYPE 2 DIABETES MELLITUS WITH HYPERGLYCEMIA, WITH LONG-TERM CURRENT USE OF INSULIN: Chronic | ICD-10-CM

## 2024-04-09 RX ORDER — INSULIN ASPART 100 [IU]/ML
INJECTION, SUSPENSION SUBCUTANEOUS
Start: 2024-04-09

## 2024-04-09 NOTE — TELEPHONE ENCOUNTER
Spoke to pt per dr truong he reviewed bs's increase both doses of insulin by 1 unit.  Adjusted med list

## 2024-05-09 ENCOUNTER — TELEPHONE (OUTPATIENT)
Dept: ENDOCRINOLOGY | Facility: CLINIC | Age: 79
End: 2024-05-09
Payer: MEDICARE

## 2024-06-11 ENCOUNTER — TELEPHONE (OUTPATIENT)
Dept: ENDOCRINOLOGY | Facility: CLINIC | Age: 79
End: 2024-06-11
Payer: MEDICARE

## 2024-06-11 NOTE — TELEPHONE ENCOUNTER
Spoke with patient regarding glucose log.  Patient understands to increase insulin to 17 units at breakfast and 14 units at Supper.

## 2024-06-19 ENCOUNTER — OFFICE VISIT (OUTPATIENT)
Dept: ENDOCRINOLOGY | Facility: CLINIC | Age: 79
End: 2024-06-19
Payer: MEDICARE

## 2024-06-19 VITALS
OXYGEN SATURATION: 98 % | BODY MASS INDEX: 37.37 KG/M2 | WEIGHT: 261 LBS | HEART RATE: 71 BPM | HEIGHT: 70 IN | SYSTOLIC BLOOD PRESSURE: 120 MMHG | DIASTOLIC BLOOD PRESSURE: 68 MMHG

## 2024-06-19 DIAGNOSIS — Z79.4 TYPE 2 DIABETES MELLITUS WITH HYPOGLYCEMIA WITHOUT COMA, WITH LONG-TERM CURRENT USE OF INSULIN: ICD-10-CM

## 2024-06-19 DIAGNOSIS — I10 BENIGN HYPERTENSION: ICD-10-CM

## 2024-06-19 DIAGNOSIS — N18.9 CHRONIC RENAL FAILURE, UNSPECIFIED CKD STAGE: ICD-10-CM

## 2024-06-19 DIAGNOSIS — E78.00 PURE HYPERCHOLESTEROLEMIA: ICD-10-CM

## 2024-06-19 DIAGNOSIS — Z79.4 TYPE 2 DIABETES MELLITUS WITH HYPERGLYCEMIA, WITH LONG-TERM CURRENT USE OF INSULIN: Primary | ICD-10-CM

## 2024-06-19 DIAGNOSIS — E11.65 TYPE 2 DIABETES MELLITUS WITH HYPERGLYCEMIA, WITH LONG-TERM CURRENT USE OF INSULIN: Primary | ICD-10-CM

## 2024-06-19 DIAGNOSIS — E11.649 TYPE 2 DIABETES MELLITUS WITH HYPOGLYCEMIA WITHOUT COMA, WITH LONG-TERM CURRENT USE OF INSULIN: ICD-10-CM

## 2024-06-19 LAB
EXPIRATION DATE: ABNORMAL
EXPIRATION DATE: ABNORMAL
GLUCOSE BLDC GLUCOMTR-MCNC: 150 MG/DL (ref 70–130)
HBA1C MFR BLD: 6.7 % (ref 4.5–5.7)
Lab: ABNORMAL
Lab: ABNORMAL

## 2024-06-19 PROCEDURE — 3074F SYST BP LT 130 MM HG: CPT | Performed by: INTERNAL MEDICINE

## 2024-06-19 PROCEDURE — 1160F RVW MEDS BY RX/DR IN RCRD: CPT | Performed by: INTERNAL MEDICINE

## 2024-06-19 PROCEDURE — 99214 OFFICE O/P EST MOD 30 MIN: CPT | Performed by: INTERNAL MEDICINE

## 2024-06-19 PROCEDURE — 83036 HEMOGLOBIN GLYCOSYLATED A1C: CPT | Performed by: INTERNAL MEDICINE

## 2024-06-19 PROCEDURE — 3078F DIAST BP <80 MM HG: CPT | Performed by: INTERNAL MEDICINE

## 2024-06-19 PROCEDURE — 1159F MED LIST DOCD IN RCRD: CPT | Performed by: INTERNAL MEDICINE

## 2024-06-19 PROCEDURE — 82947 ASSAY GLUCOSE BLOOD QUANT: CPT | Performed by: INTERNAL MEDICINE

## 2024-06-19 PROCEDURE — 3044F HG A1C LEVEL LT 7.0%: CPT | Performed by: INTERNAL MEDICINE

## 2024-06-19 NOTE — PROGRESS NOTES
"     Office Note      Date: 2024  Patient Name: Abraham Mobley  MRN: [id number]  : 1945    Chief Complaint   Patient presents with    Diabetes       History of Present Illness:   Abraham Mobley is a 79 y.o. male who presents for Diabetes type 2. Diagnosed in: . Treated in past with insulin. He didn't tolerate Ozempic due to GI upset.  Current treatments: premix insulin. Number of insulin shots per day: 2. Checks blood sugar 2 times a day. Has low blood sugar: none. Aspirin use: Yes. Statin use: Yes. ACE-I/ARB use: No - per nephrology. Changes in health since last visit: none. Last eye exam 2023.     Subjective      Diabetic Complications:  Eyes: No  Kidneys: Yes - CRI  Feet: No  Heart: No    Diet and Exercise:  Meals per day: 3  Minutes of exercise per week: 0 mins.    Review of Systems:   Review of Systems   Constitutional: Negative.    Cardiovascular: Negative.    Gastrointestinal: Negative.    Endocrine: Negative.        The following portions of the patient's history were reviewed and updated as appropriate: allergies, current medications, past family history, past medical history, past social history, past surgical history, and problem list.    Objective     Visit Vitals  /68   Pulse 71   Ht 177.8 cm (70\")   Wt 118 kg (261 lb)   SpO2 98%   BMI 37.45 kg/m²       Physical Exam:  Physical Exam  Constitutional:       Appearance: Normal appearance.   Neurological:      Mental Status: He is alert.         Labs:    HbA1c  Lab Results   Component Value Date    HGBA1C 6.7 (A) 2024       CMP  No results found for: \"GLUCOSE\", \"BUN\", \"CREATININE\", \"EGFRIFNONA\", \"EGFRIFAFRI\", \"BCR\", \"K\", \"CO2\", \"CALCIUM\", \"PROTENTOTREF\", \"LABIL2\", \"BILIRUBIN\", \"AST\", \"ALT\"     Lipid Panel        TSH  No results found for: \"TSH\", \"FREET4\"     Hemoglobin A1C  Lab Results   Component Value Date    HGBA1C 6.7 (A) 2024        Microalbumin/Creatinine  No results found for: \"MALBCRERATIO\", \"CREATINIURIN\", " "\"MICROALBUR\"        Assessment / Plan      Assessment & Plan:  Diagnoses and all orders for this visit:    1. Type 2 diabetes mellitus with hyperglycemia, with long-term current use of insulin (Primary)  Assessment & Plan:  Diabetes is stable.   Continue current treatment regimen.  Diabetes will be reassessed in 6 months.    Orders:  -     POC Glucose, Blood  -     POC Glycosylated Hemoglobin (Hb A1C)    2. Type 2 diabetes mellitus with hypoglycemia without coma, with long-term current use of insulin  Assessment & Plan:  No issues with hypoglycemia recently.      3. Benign hypertension  Assessment & Plan:  Hypertension is stable and controlled  Continue current treatment regimen.  Blood pressure will be reassessed in 6 months.      4. Pure hypercholesterolemia  Assessment & Plan:  Continue statin.      5. Chronic renal failure, unspecified CKD stage  Assessment & Plan:  Continue nephrology follow up.        Current Outpatient Medications   Medication Instructions    allopurinol (ZYLOPRIM) 300 MG tablet No dose, route, or frequency recorded.    amLODIPine (NORVASC) 5 mg, Oral, Daily    aspirin 81 mg, Oral, Daily    atorvastatin (LIPITOR) 40 MG tablet No dose, route, or frequency recorded.    BD Pen Needle Chela 2nd Gen 32G X 4 MM misc Use to inject insulin 2 times daily    Blood Glucose Monitoring Suppl (ONE TOUCH ULTRA 2) w/Device kit Use to test blood sugar twice daily.  Dx E11.65    cholecalciferol (VITAMIN D3) 2,000 Units, Oral, Daily    Ferrous Sulfate (IRON) 325 (65 Fe) MG tablet Oral    Gvoke HypoPen 2-Pack 1 mg, Subcutaneous, As Needed    hydrALAZINE (APRESOLINE) 50 mg, Oral, 2 Times Daily    isosorbide mononitrate (IMDUR) 30 mg, Oral, Daily    Lactobacillus (PROBIOTIC ACIDOPHILUS PO) Oral    magnesium oxide (MAG-OX) 400 mg, Oral, Daily    metOLazone (ZAROXOLYN) 5 MG tablet No dose, route, or frequency recorded.    metoprolol tartrate (LOPRESSOR) 25 MG tablet No dose, route, or frequency recorded.    Multiple " Vitamin (MULTI VITAMIN DAILY PO) Oral    NovoLOG Mix 70/30 FlexPen (70-30) 100 UNIT/ML suspension pen-injector injection Inject 16 units at breakfast and 13 units at supper; titrate to max 50 units daily    OneTouch Ultra test strip 1 each, Other, 2 Times Daily, to check glucose twice daily.  Dx E11.65 on insulin    SODIUM BICARBONATE PO Oral, 3 Times Daily    tamsulosin (FLOMAX) 0.4 MG capsule 24 hr capsule 1 capsule, Oral, Daily      Return in about 6 months (around 12/19/2024) for Recheck with A1c.    Electronically signed by: Harpreet Oliveros MD  06/19/2024

## 2024-07-08 ENCOUNTER — TELEPHONE (OUTPATIENT)
Dept: ENDOCRINOLOGY | Facility: CLINIC | Age: 79
End: 2024-07-08
Payer: MEDICARE

## 2024-07-08 NOTE — TELEPHONE ENCOUNTER
LVM for patient to let him know Dr Oliveros looked at his glucose log and would like him to go up to 20 units.

## 2024-07-09 ENCOUNTER — TELEPHONE (OUTPATIENT)
Dept: ENDOCRINOLOGY | Facility: CLINIC | Age: 79
End: 2024-07-09
Payer: MEDICARE

## 2024-08-13 ENCOUNTER — TELEPHONE (OUTPATIENT)
Dept: ENDOCRINOLOGY | Facility: CLINIC | Age: 79
End: 2024-08-13
Payer: MEDICARE

## 2024-08-23 RX ORDER — BLOOD SUGAR DIAGNOSTIC
1 STRIP MISCELLANEOUS 2 TIMES DAILY
Qty: 200 EACH | Refills: 3 | Status: SHIPPED | OUTPATIENT
Start: 2024-08-23

## 2024-08-23 NOTE — TELEPHONE ENCOUNTER
Caller: [facility] [address] [place] [address] 591 [facility] - [phone] [facility] [phone] FX    Relationship: Pharmacy      Requested Prescriptions:   Requested Prescriptions     Pending Prescriptions Disp Refills    OneTouch Ultra test strip 200 each 3     Si each by Other route 2 (Two) Times a Day. to check glucose twice daily.  Dx E11.65 on insulin        Pharmacy where request should be sent:    [facility] [address] [place] [address] [address] [place] [address] [phone] [facility] [phone]  [phone]          Last office visit with prescribing clinician: 2024   Last telemedicine visit with prescribing clinician: Visit date not found   Next office visit with prescribing clinician: 2025     Additional details provided by patient: NEXT DX CODE SENT WITH IT    Does the patient have less than a 3 day supply:  [x] Yes  [] No    Would you like a call back once the refill request has been completed: [x] Yes [] No    If the office needs to give you a call back, can they leave a voicemail: [x] Yes [] No    Maile Roberson Rep   24 14:17 EDT

## 2024-08-26 DIAGNOSIS — E11.65 TYPE 2 DIABETES MELLITUS WITH HYPERGLYCEMIA, WITH LONG-TERM CURRENT USE OF INSULIN: Chronic | ICD-10-CM

## 2024-08-26 DIAGNOSIS — Z79.4 TYPE 2 DIABETES MELLITUS WITH HYPERGLYCEMIA, WITH LONG-TERM CURRENT USE OF INSULIN: Chronic | ICD-10-CM

## 2024-08-26 RX ORDER — INSULIN ASPART 100 [IU]/ML
INJECTION, SUSPENSION SUBCUTANEOUS
Qty: 45 ML | Refills: 3 | Status: SHIPPED | OUTPATIENT
Start: 2024-08-26 | End: 2024-08-27 | Stop reason: SDUPTHER

## 2024-08-26 NOTE — TELEPHONE ENCOUNTER
PT CALLED REQUESTING NOVOLOG 70/30 VIALS AND SYRINGE AND NEEDLES Rxs TO BE SENT IN TO [facility] IN [place], [place].

## 2024-08-26 NOTE — TELEPHONE ENCOUNTER
Caller: Abraham Mobley    Relationship: Self    Best call back number: [phone]    Requested Prescriptions:   Requested Prescriptions     Pending Prescriptions Disp Refills    NovoLOG Mix 70/30 FlexPen (70-30) 100 UNIT/ML suspension pen-injector injection       Sig: Inject 16 units at breakfast and 13 units at supper; titrate to max 50 units daily        Pharmacy where request should be sent:  [id number] - [address] [phone] [facility] [phone] FX [[id number]]     Last office visit with prescribing clinician: 6/19/2024   Last telemedicine visit with prescribing clinician: Visit date not found   Next office visit with prescribing clinician: 1/8/2025     Additional details provided by patient: if he can't get the pens he will have to use vials , [facility] boxes     Does the patient have less than a 3 day supply:  [x] Yes  [] No    Would you like a call back once the refill request has been completed: [] Yes [] No    If the office needs to give you a call back, can they leave a voicemail: [] Yes [] No    Maile Palacio Rep   08/26/24 10:22 EDT

## 2024-08-27 DIAGNOSIS — Z79.4 TYPE 2 DIABETES MELLITUS WITH HYPERGLYCEMIA, WITH LONG-TERM CURRENT USE OF INSULIN: Chronic | ICD-10-CM

## 2024-08-27 DIAGNOSIS — E11.65 TYPE 2 DIABETES MELLITUS WITH HYPERGLYCEMIA, WITH LONG-TERM CURRENT USE OF INSULIN: Chronic | ICD-10-CM

## 2024-08-27 RX ORDER — INSULIN ASPART 100 [IU]/ML
INJECTION, SUSPENSION SUBCUTANEOUS
Qty: 45 ML | Refills: 3 | Status: SHIPPED | OUTPATIENT
Start: 2024-08-27

## 2024-08-27 NOTE — TELEPHONE ENCOUNTER
PT CALLED REQUESTING NOVOLOG 70/30 PENS RX TO BE SENT IN TO [facility] IN [place].    PT IS ALMOST OUT OF MEDS.

## 2024-09-06 ENCOUNTER — TELEPHONE (OUTPATIENT)
Dept: ENDOCRINOLOGY | Facility: CLINIC | Age: 79
End: 2024-09-06
Payer: MEDICARE

## 2024-09-09 NOTE — TELEPHONE ENCOUNTER
Spoke with patient and patient's wife.  Patient started Ozempic dosage on July 3rd.  He has dropped his Novolog per instructions by 3 units.  He started with 15 units instead of 18 qam and 11 units qpm.  Patient's glucose is still dropping.  He decreased 3 more units.12 units qam and 8 units qpm.  Glucose this am was 118 mg/dL.  The patient's wife has concerns with the Novolog dosage due to recurrent hypoglycemia.  Patient has lost 3 lbs.  Please advise patient.   Calm

## 2024-09-13 RX ORDER — PEN NEEDLE, DIABETIC 32GX 5/32"
NEEDLE, DISPOSABLE MISCELLANEOUS
Qty: 100 EACH | Refills: 0 | Status: SHIPPED | OUTPATIENT
Start: 2024-09-13

## 2024-09-13 NOTE — TELEPHONE ENCOUNTER
Rx Refill Note  Requested Prescriptions     Pending Prescriptions Disp Refills    BD Pen Needle Moi 2nd Gen 32G X 4 MM misc [Pharmacy Med Name: BD PEN NEEDLE/MOI [id number] MIS] 100 each 0     Sig: USE TO INJECT INSULIN TWICE DAILY          Last office visit with prescribing clinician: 6/19/2024     Next office visit with prescribing clinician: 1/8/2025         Yanira Castellanos MA  09/13/24, 13:07 EDT

## 2024-10-09 ENCOUNTER — TELEPHONE (OUTPATIENT)
Dept: ENDOCRINOLOGY | Facility: CLINIC | Age: 79
End: 2024-10-09
Payer: MEDICARE

## 2024-10-09 NOTE — TELEPHONE ENCOUNTER
Spoke with patient regarding glucose log.  Dr Oliveros reviewed the log and patient was informed to increase supper insulin dosage.  Take the dose from 14 units to 15 units.  Patient verbalized understanding.

## 2024-11-05 ENCOUNTER — TELEPHONE (OUTPATIENT)
Dept: ENDOCRINOLOGY | Facility: CLINIC | Age: 79
End: 2024-11-05
Payer: MEDICARE

## 2024-11-08 RX ORDER — PEN NEEDLE, DIABETIC 32GX 5/32"
NEEDLE, DISPOSABLE MISCELLANEOUS
Qty: 100 EACH | Refills: 0 | Status: SHIPPED | OUTPATIENT
Start: 2024-11-08

## 2024-11-08 NOTE — TELEPHONE ENCOUNTER
Rx Refill Note  Requested Prescriptions     Pending Prescriptions Disp Refills    BD Pen Needle Moi 2nd Gen 32G X 4 MM misc [Pharmacy Med Name: BD PEN NEEDLE/MOI [id number] MIS] 100 each 0     Sig: USE TO INJECT INSULIN TWICE DAILY          Last office visit with prescribing clinician: 6/19/2024     Next office visit with prescribing clinician: 1/8/2025         Yanira Castellanos MA  11/08/24, 10:16 EST

## 2024-12-11 ENCOUNTER — TELEPHONE (OUTPATIENT)
Dept: ENDOCRINOLOGY | Facility: CLINIC | Age: 79
End: 2024-12-11
Payer: MEDICARE

## 2024-12-11 NOTE — TELEPHONE ENCOUNTER
Spoke with patient regarding glucose log.  Patient understood that no changes are needed in his insulin regimen per Dr Oliveros review of glucose log.

## 2024-12-27 RX ORDER — PEN NEEDLE, DIABETIC 32GX 5/32"
NEEDLE, DISPOSABLE MISCELLANEOUS
Qty: 100 EACH | Refills: 0 | Status: SHIPPED | OUTPATIENT
Start: 2024-12-27

## 2024-12-27 NOTE — TELEPHONE ENCOUNTER
Rx Refill Note  Requested Prescriptions     Pending Prescriptions Disp Refills    BD Pen Needle Moi 2nd Gen 32G X 4 MM misc [Pharmacy Med Name: BD MOI PEN NDL [id number] MIS] 100 each 0     Sig: USE TO INJECT INSULIN TWICE DAILY      Last office visit with prescribing clinician: 6/19/2024      Next office visit with prescribing clinician: 1/8/2025       Nan Nava MA  12/27/24, 08:33 EST

## 2025-01-08 ENCOUNTER — OFFICE VISIT (OUTPATIENT)
Dept: ENDOCRINOLOGY | Facility: CLINIC | Age: 80
End: 2025-01-08
Payer: MEDICARE

## 2025-01-08 VITALS
SYSTOLIC BLOOD PRESSURE: 118 MMHG | OXYGEN SATURATION: 96 % | WEIGHT: 262 LBS | DIASTOLIC BLOOD PRESSURE: 69 MMHG | HEIGHT: 70 IN | BODY MASS INDEX: 37.51 KG/M2 | HEART RATE: 71 BPM

## 2025-01-08 DIAGNOSIS — I10 BENIGN HYPERTENSION: ICD-10-CM

## 2025-01-08 DIAGNOSIS — E11.65 TYPE 2 DIABETES MELLITUS WITH HYPERGLYCEMIA, WITH LONG-TERM CURRENT USE OF INSULIN: Primary | ICD-10-CM

## 2025-01-08 DIAGNOSIS — E78.00 PURE HYPERCHOLESTEROLEMIA: ICD-10-CM

## 2025-01-08 DIAGNOSIS — Z79.4 TYPE 2 DIABETES MELLITUS WITH HYPERGLYCEMIA, WITH LONG-TERM CURRENT USE OF INSULIN: Primary | ICD-10-CM

## 2025-01-08 LAB
EXPIRATION DATE: ABNORMAL
EXPIRATION DATE: ABNORMAL
GLUCOSE BLDC GLUCOMTR-MCNC: 148 MG/DL (ref 70–130)
HBA1C MFR BLD: 6.6 % (ref 4.5–5.7)
Lab: ABNORMAL
Lab: ABNORMAL

## 2025-01-08 PROCEDURE — 3044F HG A1C LEVEL LT 7.0%: CPT | Performed by: INTERNAL MEDICINE

## 2025-01-08 PROCEDURE — 82947 ASSAY GLUCOSE BLOOD QUANT: CPT | Performed by: INTERNAL MEDICINE

## 2025-01-08 PROCEDURE — 99214 OFFICE O/P EST MOD 30 MIN: CPT | Performed by: INTERNAL MEDICINE

## 2025-01-08 PROCEDURE — 1160F RVW MEDS BY RX/DR IN RCRD: CPT | Performed by: INTERNAL MEDICINE

## 2025-01-08 PROCEDURE — 3074F SYST BP LT 130 MM HG: CPT | Performed by: INTERNAL MEDICINE

## 2025-01-08 PROCEDURE — 1159F MED LIST DOCD IN RCRD: CPT | Performed by: INTERNAL MEDICINE

## 2025-01-08 PROCEDURE — 83036 HEMOGLOBIN GLYCOSYLATED A1C: CPT | Performed by: INTERNAL MEDICINE

## 2025-01-08 PROCEDURE — 3078F DIAST BP <80 MM HG: CPT | Performed by: INTERNAL MEDICINE

## 2025-01-08 NOTE — PROGRESS NOTES
"     Office Note      Date: 2025  Patient Name: Abraham Mobley  MRN: [id number]  : 1945    Chief Complaint   Patient presents with    Diabetes       History of Present Illness:   Abraham Mobley is a 79 y.o. male who presents for Diabetes type 2. Diagnosed in: . Treated in past with insulin. He didn't tolerate Ozempic due to GI upset.  Current treatments: premix insulin. Number of insulin shots per day: 2. Checks blood sugar 2 times a day. Has low blood sugar: rare. Aspirin use: Yes. Statin use: Yes. ACE-I/ARB use: No - per nephrology. Changes in health since last visit: none. Last eye exam 2024.     Subjective      Diabetic Complications:  Eyes: No  Kidneys: Yes - CRI  Feet: No  Heart: No    Diet and Exercise:  Meals per day: 3  Minutes of exercise per week: 0 mins.    Review of Systems:   Review of Systems   Constitutional: Negative.    Cardiovascular: Negative.    Gastrointestinal: Negative.    Endocrine: Negative.        The following portions of the patient's history were reviewed and updated as appropriate: allergies, current medications, past family history, past medical history, past social history, past surgical history, and problem list.    Objective     Visit Vitals  /69   Pulse 71   Ht 177.8 cm (70\")   Wt 119 kg (262 lb)   SpO2 96%   BMI 37.59 kg/m²       Physical Exam:  Physical Exam  Constitutional:       Appearance: Normal appearance.   Neurological:      Mental Status: He is alert.         Labs:    HbA1c  Lab Results   Component Value Date    HGBA1C 6.6 (A) 2025       CMP  No results found for: \"GLUCOSE\", \"BUN\", \"CREATININE\", \"EGFRIFNONA\", \"EGFRIFAFRI\", \"BCR\", \"K\", \"CO2\", \"CALCIUM\", \"PROTENTOTREF\", \"LABIL2\", \"BILIRUBIN\", \"AST\", \"ALT\"     Lipid Panel  No results found for: \"HDL\", \"LDL\", \"TRIG\"     TSH  No results found for: \"TSH\", \"FREET4\"     Hemoglobin A1C  No components found for: \"HGBA1C\"     Microalbumin/Creatinine  No results found for: \"MALBCRERATIO\", \"CREATINIURIN\", " "\"MICROALBUR\"        Assessment / Plan      Assessment & Plan:  Diagnoses and all orders for this visit:    1. Type 2 diabetes mellitus with hyperglycemia, with long-term current use of insulin (Primary)  Assessment & Plan:  Diabetes is stable.   Continue current treatment regimen.  Diabetes will be reassessed in 6 months.    Orders:  -     POC Glucose, Blood  -     POC Glycosylated Hemoglobin (Hb A1C)    2. Benign hypertension  Assessment & Plan:  Hypertension is stable and controlled.  Continue current treatment regimen.  Blood pressure will be reassessed in 6 months.      3. Pure hypercholesterolemia  Assessment & Plan:  Continue statin.  Plan to check lipids next visit.        Current Outpatient Medications   Medication Instructions    allopurinol (ZYLOPRIM) 300 MG tablet No dose, route, or frequency recorded.    amLODIPine (NORVASC) 5 mg, Daily    aspirin 81 mg, Daily    atorvastatin (LIPITOR) 40 MG tablet No dose, route, or frequency recorded.    BD Pen Needle Chela 2nd Gen 32G X 4 MM misc USE TO INJECT INSULIN TWICE DAILY    Blood Glucose Monitoring Suppl (ONE TOUCH ULTRA 2) w/Device kit Use to test blood sugar twice daily.  Dx E11.65    cholecalciferol (VITAMIN D3) 2,000 Units, Daily    Ferrous Sulfate (IRON) 325 (65 Fe) MG tablet Take  by mouth.    Gvoke HypoPen 2-Pack 1 mg, Subcutaneous, As Needed    hydrALAZINE (APRESOLINE) 50 mg, 2 Times Daily    isosorbide mononitrate (IMDUR) 30 mg, Daily    Lactobacillus (PROBIOTIC ACIDOPHILUS PO) Take  by mouth.    magnesium oxide (MAG-OX) 400 mg, Daily    metOLazone (ZAROXOLYN) 5 MG tablet No dose, route, or frequency recorded.    metoprolol tartrate (LOPRESSOR) 25 MG tablet No dose, route, or frequency recorded.    Multiple Vitamin (MULTI VITAMIN DAILY PO) Take  by mouth.    NovoLOG Mix 70/30 FlexPen (70-30) 100 UNIT/ML suspension pen-injector injection Inject 16 units at breakfast and 13 units at supper; titrate to max 50 units daily    OneTouch Ultra test strip 1 " each, Other, 2 Times Daily, to check glucose twice daily.  Dx E11.65 on insulin    SODIUM BICARBONATE PO 3 Times Daily    tamsulosin (FLOMAX) 0.4 MG capsule 24 hr capsule 1 capsule, Daily      Return in about 6 months (around 7/8/2025) for Recheck with A1c, CMP, lipid, TSH, microalbumin, foot exam.    Electronically signed by: Harpreet Oliveros MD  01/08/2025

## 2025-01-16 ENCOUNTER — TELEPHONE (OUTPATIENT)
Dept: ENDOCRINOLOGY | Facility: CLINIC | Age: 80
End: 2025-01-16
Payer: MEDICARE

## 2025-02-13 ENCOUNTER — TELEPHONE (OUTPATIENT)
Dept: ENDOCRINOLOGY | Facility: CLINIC | Age: 80
End: 2025-02-13
Payer: MEDICARE

## 2025-02-13 NOTE — TELEPHONE ENCOUNTER
Ot was called and verbalized understanding. The provider stated blood sugars look ok, no changes,ami herman

## 2025-02-20 RX ORDER — PEN NEEDLE, DIABETIC 32GX 5/32"
NEEDLE, DISPOSABLE MISCELLANEOUS
Qty: 100 EACH | Refills: 1 | Status: SHIPPED | OUTPATIENT
Start: 2025-02-20

## 2025-02-20 NOTE — TELEPHONE ENCOUNTER
Rx Refill Note  Requested Prescriptions     Signed Prescriptions Disp Refills    BD Pen Needle Chela 2nd Gen 32G X 4 MM misc 100 each 1     Sig: USE TO INJECT INSULIN TWICE DAILY     Authorizing Provider: MELODY GARCIA     Ordering User: KEY WILKINSON      Last office visit with prescribing clinician: 1/8/2025     Next office visit with prescribing clinician: 7/25/2025       Key Wilkinson  02/20/25, 12:50 EST

## 2025-03-04 ENCOUNTER — TELEPHONE (OUTPATIENT)
Dept: ENDOCRINOLOGY | Facility: CLINIC | Age: 80
End: 2025-03-04
Payer: MEDICARE

## 2025-03-04 NOTE — TELEPHONE ENCOUNTER
"Spoke with patient regarding glucose log that was reviewed by Dr Oliveros.  Dr Oliveros wanted the patient to know that \"No Changes\" needed to be made regarding insulin regimen.  Patient verbalized understanding.  "

## 2025-05-16 DIAGNOSIS — Z79.4 TYPE 2 DIABETES MELLITUS WITH HYPERGLYCEMIA, WITH LONG-TERM CURRENT USE OF INSULIN: Chronic | ICD-10-CM

## 2025-05-16 DIAGNOSIS — E11.65 TYPE 2 DIABETES MELLITUS WITH HYPERGLYCEMIA, WITH LONG-TERM CURRENT USE OF INSULIN: Chronic | ICD-10-CM

## 2025-05-16 RX ORDER — INSULIN ASPART 100 [IU]/ML
INJECTION, SUSPENSION SUBCUTANEOUS
Qty: 45 ML | Refills: 3 | Status: SHIPPED | OUTPATIENT
Start: 2025-05-16

## 2025-05-16 NOTE — TELEPHONE ENCOUNTER
Caller: Abraham Mobley    Relationship: Self    Best call back number: [phone]    Requested Prescriptions:   Requested Prescriptions     Pending Prescriptions Disp Refills    NovoLOG Mix 70/30 FlexPen (70-30) 100 UNIT/ML suspension pen-injector injection 45 mL 3     Sig: Inject 16 units at breakfast and 13 units at supper; titrate to max 50 units daily        Pharmacy where request should be sent:    [address] [phone] [facility] [phone]        Last office visit with prescribing clinician: 1/8/2025   Last telemedicine visit with prescribing clinician: Visit date not found   Next office visit with prescribing clinician: 7/25/2025     Does the patient have less than a 3 day supply:  [] Yes  [] No    Would you like a call back once the refill request has been completed: [] Yes [] No    If the office needs to give you a call back, can they leave a voicemail: [] Yes [] No    Maile Ryan Rep   05/16/25 12:05 EDT

## 2025-05-16 NOTE — TELEPHONE ENCOUNTER
Rx Refill Note  Requested Prescriptions     Pending Prescriptions Disp Refills    NovoLOG Mix 70/30 FlexPen (70-30) 100 UNIT/ML suspension pen-injector injection 45 mL 3     Sig: Inject 16 units at breakfast and 13 units at supper; titrate to max 50 units daily          Last office visit with prescribing clinician: 1/8/2025     Next office visit with prescribing clinician: 7/25/2025         Yanira Castellanos MA  05/16/25, 12:14 EDT

## 2025-05-28 RX ORDER — PEN NEEDLE, DIABETIC 32GX 5/32"
NEEDLE, DISPOSABLE MISCELLANEOUS
Qty: 100 EACH | Refills: 6 | Status: SHIPPED | OUTPATIENT
Start: 2025-05-28

## 2025-05-29 RX ORDER — BLOOD SUGAR DIAGNOSTIC
1 STRIP MISCELLANEOUS 2 TIMES DAILY
Qty: 200 EACH | Refills: 3 | Status: SHIPPED | OUTPATIENT
Start: 2025-05-29

## 2025-06-11 ENCOUNTER — TELEPHONE (OUTPATIENT)
Dept: ENDOCRINOLOGY | Facility: CLINIC | Age: 80
End: 2025-06-11
Payer: MEDICARE

## 2025-06-11 NOTE — TELEPHONE ENCOUNTER
Spoke with patient regarding glucose log.  Dr Oliveros adjusted morning injection from 20 units to 22 units. Patient verbalized understanding.

## 2025-07-08 ENCOUNTER — TELEPHONE (OUTPATIENT)
Dept: ENDOCRINOLOGY | Facility: CLINIC | Age: 80
End: 2025-07-08

## 2025-07-25 ENCOUNTER — OFFICE VISIT (OUTPATIENT)
Dept: ENDOCRINOLOGY | Facility: CLINIC | Age: 80
End: 2025-07-25
Payer: MEDICARE

## 2025-07-25 VITALS
DIASTOLIC BLOOD PRESSURE: 68 MMHG | HEIGHT: 70 IN | HEART RATE: 81 BPM | SYSTOLIC BLOOD PRESSURE: 116 MMHG | WEIGHT: 260 LBS | OXYGEN SATURATION: 98 % | BODY MASS INDEX: 37.22 KG/M2

## 2025-07-25 DIAGNOSIS — Z79.4 TYPE 2 DIABETES MELLITUS WITH HYPERGLYCEMIA, WITH LONG-TERM CURRENT USE OF INSULIN: Primary | ICD-10-CM

## 2025-07-25 DIAGNOSIS — I10 BENIGN HYPERTENSION: ICD-10-CM

## 2025-07-25 DIAGNOSIS — E78.00 PURE HYPERCHOLESTEROLEMIA: ICD-10-CM

## 2025-07-25 DIAGNOSIS — E11.65 TYPE 2 DIABETES MELLITUS WITH HYPERGLYCEMIA, WITH LONG-TERM CURRENT USE OF INSULIN: Primary | ICD-10-CM

## 2025-07-25 LAB
ALBUMIN SERPL-MCNC: 3.7 G/DL (ref 3.5–5.2)
ALBUMIN UR-MCNC: 3.9 MG/DL
ALBUMIN/GLOB SERPL: 1.3 G/DL
ALP SERPL-CCNC: 117 U/L (ref 39–117)
ALT SERPL W P-5'-P-CCNC: 16 U/L (ref 1–41)
ANION GAP SERPL CALCULATED.3IONS-SCNC: 10 MMOL/L (ref 5–15)
AST SERPL-CCNC: 19 U/L (ref 1–40)
BILIRUB SERPL-MCNC: 0.3 MG/DL (ref 0–1.2)
BUN SERPL-MCNC: 34 MG/DL (ref 8–23)
BUN/CREAT SERPL: 13.1 (ref 7–25)
CALCIUM SPEC-SCNC: 8.6 MG/DL (ref 8.6–10.5)
CHLORIDE SERPL-SCNC: 112 MMOL/L (ref 98–107)
CHOLEST SERPL-MCNC: 100 MG/DL (ref 0–200)
CO2 SERPL-SCNC: 19 MMOL/L (ref 22–29)
CREAT SERPL-MCNC: 2.59 MG/DL (ref 0.76–1.27)
CREAT UR-MCNC: 131.9 MG/DL
EGFRCR SERPLBLD CKD-EPI 2021: 24.1 ML/MIN/1.73
EXPIRATION DATE: ABNORMAL
EXPIRATION DATE: ABNORMAL
GLOBULIN UR ELPH-MCNC: 2.9 GM/DL
GLUCOSE BLDC GLUCOMTR-MCNC: 139 MG/DL (ref 70–130)
GLUCOSE SERPL-MCNC: 123 MG/DL (ref 65–99)
HBA1C MFR BLD: 6.3 % (ref 4.5–5.7)
HDLC SERPL-MCNC: 30 MG/DL (ref 40–60)
LDLC SERPL CALC-MCNC: 46 MG/DL (ref 0–100)
LDLC/HDLC SERPL: 1.45 {RATIO}
Lab: ABNORMAL
Lab: ABNORMAL
MICROALBUMIN/CREAT UR: 29.6 MG/G (ref 0–29)
POTASSIUM SERPL-SCNC: 4.8 MMOL/L (ref 3.5–5.2)
PROT SERPL-MCNC: 6.6 G/DL (ref 6–8.5)
SODIUM SERPL-SCNC: 141 MMOL/L (ref 136–145)
TRIGL SERPL-MCNC: 132 MG/DL (ref 0–150)
TSH SERPL DL<=0.05 MIU/L-ACNC: 2.46 UIU/ML (ref 0.27–4.2)
VLDLC SERPL-MCNC: 24 MG/DL (ref 5–40)

## 2025-07-25 PROCEDURE — 82043 UR ALBUMIN QUANTITATIVE: CPT | Performed by: INTERNAL MEDICINE

## 2025-07-25 PROCEDURE — 82570 ASSAY OF URINE CREATININE: CPT | Performed by: INTERNAL MEDICINE

## 2025-07-25 PROCEDURE — 80061 LIPID PANEL: CPT | Performed by: INTERNAL MEDICINE

## 2025-07-25 PROCEDURE — 84443 ASSAY THYROID STIM HORMONE: CPT | Performed by: INTERNAL MEDICINE

## 2025-07-25 PROCEDURE — 80053 COMPREHEN METABOLIC PANEL: CPT | Performed by: INTERNAL MEDICINE

## 2025-07-25 NOTE — PROGRESS NOTES
"     Office Note      Date: 2025  Patient Name: Abraham Mobley  MRN: [id number]  : 1945    Chief Complaint   Patient presents with    Diabetes       History of Present Illness:   Abraham Mobley is a 80 y.o. male who presents for Diabetes type 2. Diagnosed in: . Treated in past with insulin. He didn't tolerate Ozempic due to GI upset.  Current treatments: premix insulin. Number of insulin shots per day: 2. Checks blood sugar 2 times a day. Has low blood sugar: rare. Aspirin use: Yes. Statin use: Yes. ACE-I/ARB use: No - per nephrology. Changes in health since last visit: none. Last eye exam 2024.     Subjective      Diabetic Complications:  Eyes: No  Kidneys: Yes - CRI  Feet: No  Heart: No    Diet and Exercise:  Meals per day: 3  Minutes of exercise per week: 0 mins.    Review of Systems:   Review of Systems   Constitutional: Negative.    Cardiovascular: Negative.    Gastrointestinal: Negative.    Endocrine: Negative.        The following portions of the patient's history were reviewed and updated as appropriate: allergies, current medications, past family history, past medical history, past social history, past surgical history, and problem list.    Objective     Visit Vitals  /68 (BP Location: Right arm, Patient Position: Sitting, Cuff Size: Adult)   Pulse 81   Ht 177.8 cm (70\")   Wt 118 kg (260 lb)   SpO2 98%   BMI 37.31 kg/m²       Physical Exam:  Physical Exam  Constitutional:       Appearance: Normal appearance.   Cardiovascular:      Pulses:           Dorsalis pedis pulses are 1+ on the right side and 2+ on the left side.        Posterior tibial pulses are 1+ on the right side and 2+ on the left side.   Feet:      Right foot:      Protective Sensation: 5 sites tested.  2 sites sensed.      Skin integrity: Skin integrity normal.      Toenail Condition: Right toenails are abnormally thick.      Left foot:      Protective Sensation: 5 sites tested.  2 sites sensed.      Skin integrity: Skin integrity " "normal.      Toenail Condition: Left toenails are abnormally thick.      Comments: Decreased sensation in toes  Neurological:      Mental Status: He is alert.         Labs:    HbA1c  Lab Results   Component Value Date    HGBA1C 6.3 (A) 07/25/2025       CMP  No results found for: \"GLUCOSE\", \"BUN\", \"CREATININE\", \"EGFRIFNONA\", \"EGFRIFAFRI\", \"BCR\", \"K\", \"CO2\", \"CALCIUM\", \"PROTENTOTREF\", \"LABIL2\", \"BILIRUBIN\", \"AST\", \"ALT\"     Lipid Panel  No results found for: \"HDL\", \"LDL\", \"TRIG\"     TSH  No results found for: \"TSH\", \"FREET4\"     Hemoglobin A1C  No components found for: \"HGBA1C\"     Microalbumin/Creatinine  No results found for: \"MALBCRERATIO\", \"CREATINIURIN\", \"MICROALBUR\"        Assessment / Plan      Assessment & Plan:  Diagnoses and all orders for this visit:    1. Type 2 diabetes mellitus with hyperglycemia, with long-term current use of insulin (Primary)  Assessment & Plan:  Diabetes is stable.   Continue current treatment regimen.  Diabetes will be reassessed in 6 months.    Orders:  -     POC Glucose, Blood  -     POC Glycosylated Hemoglobin (Hb A1C)  -     Comprehensive Metabolic Panel; Future  -     Lipid Panel; Future  -     Microalbumin / Creatinine Urine Ratio - Urine, Clean Catch; Future  -     TSH; Future    2. Benign hypertension  Assessment & Plan:  Hypertension is stable and controlled.  Continue current treatment regimen.  Blood pressure will be reassessed in 6 months.      3. Pure hypercholesterolemia  Assessment & Plan:  Continue statin.  Check lipids.        Current Outpatient Medications   Medication Instructions    allopurinol (ZYLOPRIM) 300 MG tablet No dose, route, or frequency recorded.    amLODIPine (NORVASC) 5 mg, Daily    aspirin 81 mg, Daily    atorvastatin (LIPITOR) 40 MG tablet No dose, route, or frequency recorded.    BD Pen Needle Chela 2nd Gen 32G X 4 MM misc USE TO INJECT INSULIN TWICE DAILY    Blood Glucose Monitoring Suppl (ONE TOUCH ULTRA 2) w/Device kit Use to test blood sugar " twice daily.  Dx E11.65    cholecalciferol (VITAMIN D3) 2,000 Units, Daily    Ferrous Sulfate (IRON) 325 (65 Fe) MG tablet Take  by mouth.    Gvoke HypoPen 2-Pack 1 mg, Subcutaneous, As Needed    hydrALAZINE (APRESOLINE) 50 mg, 2 Times Daily    isosorbide mononitrate (IMDUR) 30 mg, Daily    Lactobacillus (PROBIOTIC ACIDOPHILUS PO) Take  by mouth.    magnesium oxide (MAG-OX) 400 mg, Daily    metOLazone (ZAROXOLYN) 5 MG tablet No dose, route, or frequency recorded.    metoprolol tartrate (LOPRESSOR) 25 MG tablet No dose, route, or frequency recorded.    Multiple Vitamin (MULTI VITAMIN DAILY PO) Take  by mouth.    NovoLOG Mix 70/30 FlexPen (70-30) 100 UNIT/ML suspension pen-injector injection Inject 16 units at breakfast and 13 units at supper; titrate to max 50 units daily    OneTouch Ultra test strip 1 each, Other, 2 Times Daily, to check glucose twice daily.  Dx E11.65 on insulin    SODIUM BICARBONATE PO 3 Times Daily    tamsulosin (FLOMAX) 0.4 MG capsule 24 hr capsule 1 capsule, Daily      Return in about 6 months (around 1/25/2026) for Recheck with A1c.    Electronically signed by: Harpreet Oliveros MD  07/25/2025

## 2025-07-26 ENCOUNTER — RESULTS FOLLOW-UP (OUTPATIENT)
Dept: ENDOCRINOLOGY | Facility: CLINIC | Age: 80
End: 2025-07-26
Payer: MEDICARE

## 2025-08-11 ENCOUNTER — TELEPHONE (OUTPATIENT)
Dept: ENDOCRINOLOGY | Facility: CLINIC | Age: 80
End: 2025-08-11
Payer: MEDICARE